# Patient Record
Sex: FEMALE | Race: WHITE | Employment: PART TIME | URBAN - METROPOLITAN AREA
[De-identification: names, ages, dates, MRNs, and addresses within clinical notes are randomized per-mention and may not be internally consistent; named-entity substitution may affect disease eponyms.]

---

## 2017-04-07 ENCOUNTER — OFFICE VISIT (OUTPATIENT)
Dept: URGENT CARE | Age: 42
End: 2017-04-07

## 2017-04-07 VITALS
BODY MASS INDEX: 50.02 KG/M2 | TEMPERATURE: 98.5 F | WEIGHT: 293 LBS | HEIGHT: 64 IN | HEART RATE: 97 BPM | OXYGEN SATURATION: 95 % | RESPIRATION RATE: 20 BRPM

## 2017-04-07 DIAGNOSIS — J01.91 ACUTE RECURRENT SINUSITIS, UNSPECIFIED LOCATION: Primary | ICD-10-CM

## 2017-04-07 PROCEDURE — 99212 OFFICE O/P EST SF 10 MIN: CPT | Performed by: PHYSICIAN ASSISTANT

## 2017-04-07 RX ORDER — METHYLPREDNISOLONE 4 MG/1
TABLET ORAL
Qty: 1 KIT | Refills: 0 | Status: ON HOLD | OUTPATIENT
Start: 2017-04-07 | End: 2020-12-29 | Stop reason: HOSPADM

## 2017-04-07 ASSESSMENT — ENCOUNTER SYMPTOMS
WHEEZING: 1
NAUSEA: 0
SORE THROAT: 1
VOMITING: 0
ABDOMINAL PAIN: 0
SHORTNESS OF BREATH: 0
STRIDOR: 0
SPUTUM PRODUCTION: 1
COUGH: 1
DIARRHEA: 0

## 2020-12-26 ENCOUNTER — HOSPITAL ENCOUNTER (INPATIENT)
Age: 45
LOS: 3 days | Discharge: HOME | DRG: 177 | End: 2020-12-29
Attending: INTERNAL MEDICINE | Admitting: INTERNAL MEDICINE
Payer: COMMERCIAL

## 2020-12-26 ENCOUNTER — APPOINTMENT (OUTPATIENT)
Dept: GENERAL RADIOLOGY | Age: 45
DRG: 177 | End: 2020-12-26
Payer: COMMERCIAL

## 2020-12-26 PROBLEM — U07.1 ACUTE RESPIRATORY FAILURE DUE TO COVID-19 (HCC): Status: ACTIVE | Noted: 2020-12-26

## 2020-12-26 PROBLEM — E66.01 MORBID OBESITY WITH BMI OF 70 AND OVER, ADULT (HCC): Chronic | Status: ACTIVE | Noted: 2020-12-26

## 2020-12-26 PROBLEM — I10 ESSENTIAL HYPERTENSION: Chronic | Status: ACTIVE | Noted: 2020-12-26

## 2020-12-26 PROBLEM — J96.00 ACUTE RESPIRATORY FAILURE DUE TO COVID-19 (HCC): Status: ACTIVE | Noted: 2020-12-26

## 2020-12-26 LAB
A/G RATIO: 1.2 (ref 1.1–2.2)
ABO/RH: NORMAL
ALBUMIN SERPL-MCNC: 4.2 G/DL (ref 3.4–5)
ALP BLD-CCNC: 59 U/L (ref 40–129)
ALT SERPL-CCNC: 50 U/L (ref 10–40)
ANION GAP SERPL CALCULATED.3IONS-SCNC: 9 MMOL/L (ref 3–16)
ANTIBODY SCREEN: NORMAL
AST SERPL-CCNC: 56 U/L (ref 15–37)
BACTERIA: ABNORMAL /HPF
BASOPHILS ABSOLUTE: 0 K/UL (ref 0–0.2)
BASOPHILS RELATIVE PERCENT: 0.6 %
BILIRUB SERPL-MCNC: 0.3 MG/DL (ref 0–1)
BILIRUBIN URINE: NEGATIVE
BLOOD, URINE: ABNORMAL
BUN BLDV-MCNC: 13 MG/DL (ref 7–20)
CALCIUM SERPL-MCNC: 9.3 MG/DL (ref 8.3–10.6)
CHLORIDE BLD-SCNC: 100 MMOL/L (ref 99–110)
CLARITY: CLEAR
CO2: 27 MMOL/L (ref 21–32)
COLOR: YELLOW
CREAT SERPL-MCNC: 0.9 MG/DL (ref 0.6–1.1)
D DIMER: 316 NG/ML DDU (ref 0–229)
EOSINOPHILS ABSOLUTE: 0.1 K/UL (ref 0–0.6)
EOSINOPHILS RELATIVE PERCENT: 2.7 %
EPITHELIAL CELLS, UA: ABNORMAL /HPF (ref 0–5)
FIBRINOGEN: 471 MG/DL (ref 200–397)
GFR AFRICAN AMERICAN: >60
GFR NON-AFRICAN AMERICAN: >60
GLOBULIN: 3.6 G/DL
GLUCOSE BLD-MCNC: 90 MG/DL (ref 70–99)
GLUCOSE URINE: NEGATIVE MG/DL
HCG QUALITATIVE: NEGATIVE
HCT VFR BLD CALC: 40.3 % (ref 36–48)
HEMOGLOBIN: 12.8 G/DL (ref 12–16)
INR BLD: 1.01 (ref 0.86–1.14)
KETONES, URINE: NEGATIVE MG/DL
LACTATE DEHYDROGENASE: 564 U/L (ref 100–190)
LACTIC ACID: 0.6 MMOL/L (ref 0.4–2)
LEUKOCYTE ESTERASE, URINE: NEGATIVE
LIPASE: 22 U/L (ref 13–60)
LYMPHOCYTES ABSOLUTE: 0.6 K/UL (ref 1–5.1)
LYMPHOCYTES RELATIVE PERCENT: 20 %
MCH RBC QN AUTO: 25 PG (ref 26–34)
MCHC RBC AUTO-ENTMCNC: 31.6 G/DL (ref 31–36)
MCV RBC AUTO: 78.9 FL (ref 80–100)
MICROSCOPIC EXAMINATION: YES
MONOCYTES ABSOLUTE: 0.2 K/UL (ref 0–1.3)
MONOCYTES RELATIVE PERCENT: 6.4 %
NEUTROPHILS ABSOLUTE: 2 K/UL (ref 1.7–7.7)
NEUTROPHILS RELATIVE PERCENT: 70.3 %
NITRITE, URINE: NEGATIVE
PDW BLD-RTO: 17.8 % (ref 12.4–15.4)
PH UA: 6 (ref 5–8)
PLATELET # BLD: 169 K/UL (ref 135–450)
PMV BLD AUTO: 6.8 FL (ref 5–10.5)
POTASSIUM REFLEX MAGNESIUM: 5.6 MMOL/L (ref 3.5–5.1)
PRO-BNP: 30 PG/ML (ref 0–124)
PROCALCITONIN: 0.07 NG/ML (ref 0–0.15)
PROTEIN UA: NEGATIVE MG/DL
PROTHROMBIN TIME: 11.7 SEC (ref 10–13.2)
RBC # BLD: 5.11 M/UL (ref 4–5.2)
RBC UA: ABNORMAL /HPF (ref 0–4)
REASON FOR REJECTION: NORMAL
REJECTED TEST: NORMAL
SARS-COV-2, NAAT: DETECTED
SODIUM BLD-SCNC: 136 MMOL/L (ref 136–145)
SPECIFIC GRAVITY UA: 1.02 (ref 1–1.03)
TOTAL PROTEIN: 7.8 G/DL (ref 6.4–8.2)
TROPONIN: <0.01 NG/ML
URINE REFLEX TO CULTURE: ABNORMAL
URINE TYPE: ABNORMAL
UROBILINOGEN, URINE: 0.2 E.U./DL
WBC # BLD: 2.8 K/UL (ref 4–11)
WBC UA: ABNORMAL /HPF (ref 0–5)

## 2020-12-26 PROCEDURE — 83605 ASSAY OF LACTIC ACID: CPT

## 2020-12-26 PROCEDURE — XW033E5 INTRODUCTION OF REMDESIVIR ANTI-INFECTIVE INTO PERIPHERAL VEIN, PERCUTANEOUS APPROACH, NEW TECHNOLOGY GROUP 5: ICD-10-PCS | Performed by: INTERNAL MEDICINE

## 2020-12-26 PROCEDURE — 84703 CHORIONIC GONADOTROPIN ASSAY: CPT

## 2020-12-26 PROCEDURE — 82728 ASSAY OF FERRITIN: CPT

## 2020-12-26 PROCEDURE — 86140 C-REACTIVE PROTEIN: CPT

## 2020-12-26 PROCEDURE — 85610 PROTHROMBIN TIME: CPT

## 2020-12-26 PROCEDURE — 80053 COMPREHEN METABOLIC PANEL: CPT

## 2020-12-26 PROCEDURE — 83690 ASSAY OF LIPASE: CPT

## 2020-12-26 PROCEDURE — 81001 URINALYSIS AUTO W/SCOPE: CPT

## 2020-12-26 PROCEDURE — 86901 BLOOD TYPING SEROLOGIC RH(D): CPT

## 2020-12-26 PROCEDURE — 85025 COMPLETE CBC W/AUTO DIFF WBC: CPT

## 2020-12-26 PROCEDURE — 99285 EMERGENCY DEPT VISIT HI MDM: CPT

## 2020-12-26 PROCEDURE — 71045 X-RAY EXAM CHEST 1 VIEW: CPT

## 2020-12-26 PROCEDURE — 6360000002 HC RX W HCPCS: Performed by: PHYSICIAN ASSISTANT

## 2020-12-26 PROCEDURE — 96374 THER/PROPH/DIAG INJ IV PUSH: CPT

## 2020-12-26 PROCEDURE — 83615 LACTATE (LD) (LDH) ENZYME: CPT

## 2020-12-26 PROCEDURE — 86850 RBC ANTIBODY SCREEN: CPT

## 2020-12-26 PROCEDURE — 85379 FIBRIN DEGRADATION QUANT: CPT

## 2020-12-26 PROCEDURE — 84484 ASSAY OF TROPONIN QUANT: CPT

## 2020-12-26 PROCEDURE — 85384 FIBRINOGEN ACTIVITY: CPT

## 2020-12-26 PROCEDURE — XW13325 TRANSFUSION OF CONVALESCENT PLASMA (NONAUTOLOGOUS) INTO PERIPHERAL VEIN, PERCUTANEOUS APPROACH, NEW TECHNOLOGY GROUP 5: ICD-10-PCS | Performed by: INTERNAL MEDICINE

## 2020-12-26 PROCEDURE — 2060000000 HC ICU INTERMEDIATE R&B

## 2020-12-26 PROCEDURE — 86900 BLOOD TYPING SEROLOGIC ABO: CPT

## 2020-12-26 PROCEDURE — U0002 COVID-19 LAB TEST NON-CDC: HCPCS

## 2020-12-26 PROCEDURE — 83880 ASSAY OF NATRIURETIC PEPTIDE: CPT

## 2020-12-26 PROCEDURE — 93005 ELECTROCARDIOGRAM TRACING: CPT | Performed by: PHYSICIAN ASSISTANT

## 2020-12-26 PROCEDURE — 82306 VITAMIN D 25 HYDROXY: CPT

## 2020-12-26 PROCEDURE — 84145 PROCALCITONIN (PCT): CPT

## 2020-12-26 PROCEDURE — 6370000000 HC RX 637 (ALT 250 FOR IP): Performed by: INTERNAL MEDICINE

## 2020-12-26 PROCEDURE — 36415 COLL VENOUS BLD VENIPUNCTURE: CPT

## 2020-12-26 RX ORDER — BENZONATATE 100 MG/1
200 CAPSULE ORAL 3 TIMES DAILY PRN
Status: DISCONTINUED | OUTPATIENT
Start: 2020-12-26 | End: 2020-12-29 | Stop reason: HOSPADM

## 2020-12-26 RX ORDER — ACETAMINOPHEN 325 MG/1
650 TABLET ORAL EVERY 6 HOURS PRN
Status: DISCONTINUED | OUTPATIENT
Start: 2020-12-26 | End: 2020-12-29 | Stop reason: HOSPADM

## 2020-12-26 RX ORDER — SODIUM CHLORIDE 0.9 % (FLUSH) 0.9 %
10 SYRINGE (ML) INJECTION PRN
Status: DISCONTINUED | OUTPATIENT
Start: 2020-12-26 | End: 2020-12-29 | Stop reason: HOSPADM

## 2020-12-26 RX ORDER — SODIUM CHLORIDE 9 MG/ML
INJECTION, SOLUTION INTRAVENOUS PRN
Status: DISCONTINUED | OUTPATIENT
Start: 2020-12-26 | End: 2020-12-29 | Stop reason: HOSPADM

## 2020-12-26 RX ORDER — FLUTICASONE PROPIONATE 50 MCG
2 SPRAY, SUSPENSION (ML) NASAL DAILY
Status: DISCONTINUED | OUTPATIENT
Start: 2020-12-27 | End: 2020-12-29 | Stop reason: HOSPADM

## 2020-12-26 RX ORDER — ACETAMINOPHEN 650 MG/1
650 SUPPOSITORY RECTAL EVERY 6 HOURS PRN
Status: DISCONTINUED | OUTPATIENT
Start: 2020-12-26 | End: 2020-12-29 | Stop reason: HOSPADM

## 2020-12-26 RX ORDER — DILTIAZEM HYDROCHLORIDE 120 MG/1
120 CAPSULE, COATED, EXTENDED RELEASE ORAL DAILY
Status: DISCONTINUED | OUTPATIENT
Start: 2020-12-27 | End: 2020-12-29 | Stop reason: HOSPADM

## 2020-12-26 RX ORDER — POTASSIUM CHLORIDE 7.45 MG/ML
10 INJECTION INTRAVENOUS PRN
Status: DISCONTINUED | OUTPATIENT
Start: 2020-12-26 | End: 2020-12-28

## 2020-12-26 RX ORDER — GUAIFENESIN/DEXTROMETHORPHAN 100-10MG/5
5 SYRUP ORAL EVERY 4 HOURS PRN
Status: DISCONTINUED | OUTPATIENT
Start: 2020-12-26 | End: 2020-12-29 | Stop reason: HOSPADM

## 2020-12-26 RX ORDER — LEVOTHYROXINE SODIUM 112 UG/1
112 TABLET ORAL DAILY
Status: DISCONTINUED | OUTPATIENT
Start: 2020-12-27 | End: 2020-12-29 | Stop reason: HOSPADM

## 2020-12-26 RX ORDER — VITAMIN B COMPLEX
2000 TABLET ORAL DAILY
Status: DISCONTINUED | OUTPATIENT
Start: 2020-12-27 | End: 2020-12-29 | Stop reason: HOSPADM

## 2020-12-26 RX ORDER — DEXAMETHASONE SODIUM PHOSPHATE 10 MG/ML
10 INJECTION INTRAMUSCULAR; INTRAVENOUS ONCE
Status: COMPLETED | OUTPATIENT
Start: 2020-12-26 | End: 2020-12-26

## 2020-12-26 RX ORDER — DULOXETIN HYDROCHLORIDE 30 MG/1
30 CAPSULE, DELAYED RELEASE ORAL DAILY
Status: DISCONTINUED | OUTPATIENT
Start: 2020-12-27 | End: 2020-12-29 | Stop reason: HOSPADM

## 2020-12-26 RX ORDER — ALBUTEROL SULFATE 90 UG/1
2 AEROSOL, METERED RESPIRATORY (INHALATION) EVERY 4 HOURS PRN
Status: DISCONTINUED | OUTPATIENT
Start: 2020-12-26 | End: 2020-12-27

## 2020-12-26 RX ORDER — 0.9 % SODIUM CHLORIDE 0.9 %
30 INTRAVENOUS SOLUTION INTRAVENOUS PRN
Status: DISCONTINUED | OUTPATIENT
Start: 2020-12-26 | End: 2020-12-29 | Stop reason: HOSPADM

## 2020-12-26 RX ORDER — ONDANSETRON 2 MG/ML
4 INJECTION INTRAMUSCULAR; INTRAVENOUS EVERY 6 HOURS PRN
Status: DISCONTINUED | OUTPATIENT
Start: 2020-12-26 | End: 2020-12-29 | Stop reason: HOSPADM

## 2020-12-26 RX ORDER — LAMOTRIGINE 25 MG/1
50 TABLET ORAL DAILY
Status: DISCONTINUED | OUTPATIENT
Start: 2020-12-27 | End: 2020-12-29 | Stop reason: HOSPADM

## 2020-12-26 RX ORDER — METHYLPREDNISOLONE SODIUM SUCCINATE 40 MG/ML
40 INJECTION, POWDER, LYOPHILIZED, FOR SOLUTION INTRAMUSCULAR; INTRAVENOUS EVERY 12 HOURS
Status: DISCONTINUED | OUTPATIENT
Start: 2020-12-27 | End: 2020-12-29 | Stop reason: HOSPADM

## 2020-12-26 RX ORDER — POTASSIUM CHLORIDE 20 MEQ/1
40 TABLET, EXTENDED RELEASE ORAL PRN
Status: DISCONTINUED | OUTPATIENT
Start: 2020-12-26 | End: 2020-12-28

## 2020-12-26 RX ORDER — MAGNESIUM SULFATE 1 G/100ML
1 INJECTION INTRAVENOUS PRN
Status: DISCONTINUED | OUTPATIENT
Start: 2020-12-26 | End: 2020-12-28

## 2020-12-26 RX ORDER — LEVOTHYROXINE SODIUM 0.15 MG/1
150 TABLET ORAL DAILY
Status: DISCONTINUED | OUTPATIENT
Start: 2020-12-27 | End: 2020-12-26

## 2020-12-26 RX ORDER — PROMETHAZINE HYDROCHLORIDE 25 MG/1
12.5 TABLET ORAL EVERY 6 HOURS PRN
Status: DISCONTINUED | OUTPATIENT
Start: 2020-12-26 | End: 2020-12-29 | Stop reason: HOSPADM

## 2020-12-26 RX ADMIN — DEXAMETHASONE SODIUM PHOSPHATE 10 MG: 10 INJECTION INTRAMUSCULAR; INTRAVENOUS at 20:51

## 2020-12-26 NOTE — ED NOTES
Pt 85% on RA on arrival to ED. Pt placed on 4 L O2 via nc and SPO2 estefany to 96%.  Pt weaned down to 1 L O2 via nc and is maintaining 91%     Selena Elizabeth RN  12/26/20 0844

## 2020-12-26 NOTE — ED NOTES
Bed: 24  Expected date:   Expected time:   Means of arrival:   Comments:  [id number], RN  12/26/20 8989

## 2020-12-27 PROBLEM — J12.82 PNEUMONIA DUE TO COVID-19 VIRUS: Status: ACTIVE | Noted: 2020-12-27

## 2020-12-27 PROBLEM — E66.01 SUPER-SUPER OBESE (HCC): Status: ACTIVE | Noted: 2020-12-26

## 2020-12-27 PROBLEM — U07.1 PNEUMONIA DUE TO COVID-19 VIRUS: Status: ACTIVE | Noted: 2020-12-27

## 2020-12-27 PROBLEM — A41.9 SEPSIS (HCC): Status: ACTIVE | Noted: 2020-12-27

## 2020-12-27 LAB
A/G RATIO: 1.2 (ref 1.1–2.2)
ALBUMIN SERPL-MCNC: 4.2 G/DL (ref 3.4–5)
ALP BLD-CCNC: 67 U/L (ref 40–129)
ALT SERPL-CCNC: 54 U/L (ref 10–40)
ANION GAP SERPL CALCULATED.3IONS-SCNC: 7 MMOL/L (ref 3–16)
APTT: 34.9 SEC (ref 24.2–36.2)
AST SERPL-CCNC: 42 U/L (ref 15–37)
BASOPHILS ABSOLUTE: 0 K/UL (ref 0–0.2)
BASOPHILS RELATIVE PERCENT: 0.7 %
BILIRUB SERPL-MCNC: 0.3 MG/DL (ref 0–1)
BLOOD BANK DISPENSE STATUS: NORMAL
BLOOD BANK PRODUCT CODE: NORMAL
BPU ID: NORMAL
BUN BLDV-MCNC: 12 MG/DL (ref 7–20)
C-REACTIVE PROTEIN: 24.6 MG/L (ref 0–5.1)
CALCIUM SERPL-MCNC: 9.3 MG/DL (ref 8.3–10.6)
CHLORIDE BLD-SCNC: 101 MMOL/L (ref 99–110)
CO2: 27 MMOL/L (ref 21–32)
CREAT SERPL-MCNC: 0.9 MG/DL (ref 0.6–1.1)
D DIMER: 289 NG/ML DDU (ref 0–229)
DESCRIPTION BLOOD BANK: NORMAL
EKG ATRIAL RATE: 78 BPM
EKG DIAGNOSIS: NORMAL
EKG P AXIS: -5 DEGREES
EKG P-R INTERVAL: 168 MS
EKG Q-T INTERVAL: 396 MS
EKG QRS DURATION: 82 MS
EKG QTC CALCULATION (BAZETT): 451 MS
EKG R AXIS: -32 DEGREES
EKG T AXIS: 23 DEGREES
EKG VENTRICULAR RATE: 78 BPM
EOSINOPHILS ABSOLUTE: 0 K/UL (ref 0–0.6)
EOSINOPHILS RELATIVE PERCENT: 0.2 %
FERRITIN: 44.1 NG/ML (ref 15–150)
FIBRINOGEN: 442 MG/DL (ref 200–397)
GFR AFRICAN AMERICAN: >60
GFR NON-AFRICAN AMERICAN: >60
GLOBULIN: 3.6 G/DL
GLUCOSE BLD-MCNC: 128 MG/DL (ref 70–99)
GLUCOSE BLD-MCNC: 150 MG/DL (ref 70–99)
GLUCOSE BLD-MCNC: 162 MG/DL (ref 70–99)
GLUCOSE BLD-MCNC: 179 MG/DL (ref 70–99)
HCT VFR BLD CALC: 40.7 % (ref 36–48)
HEMOGLOBIN: 12.9 G/DL (ref 12–16)
INR BLD: 0.97 (ref 0.86–1.14)
LYMPHOCYTES ABSOLUTE: 0.3 K/UL (ref 1–5.1)
LYMPHOCYTES RELATIVE PERCENT: 15.1 %
MCH RBC QN AUTO: 24.5 PG (ref 26–34)
MCHC RBC AUTO-ENTMCNC: 31.6 G/DL (ref 31–36)
MCV RBC AUTO: 77.4 FL (ref 80–100)
MONOCYTES ABSOLUTE: 0 K/UL (ref 0–1.3)
MONOCYTES RELATIVE PERCENT: 1.5 %
NEUTROPHILS ABSOLUTE: 1.9 K/UL (ref 1.7–7.7)
NEUTROPHILS RELATIVE PERCENT: 82.5 %
PDW BLD-RTO: 18.1 % (ref 12.4–15.4)
PERFORMED ON: ABNORMAL
PLATELET # BLD: 176 K/UL (ref 135–450)
PMV BLD AUTO: 6.4 FL (ref 5–10.5)
POTASSIUM REFLEX MAGNESIUM: 4.5 MMOL/L (ref 3.5–5.1)
PROTHROMBIN TIME: 11.3 SEC (ref 10–13.2)
RBC # BLD: 5.26 M/UL (ref 4–5.2)
SODIUM BLD-SCNC: 135 MMOL/L (ref 136–145)
TOTAL PROTEIN: 7.8 G/DL (ref 6.4–8.2)
TSH SERPL DL<=0.05 MIU/L-ACNC: 1.62 UIU/ML (ref 0.27–4.2)
VITAMIN D 25-HYDROXY: 23.6 NG/ML
WBC # BLD: 2.3 K/UL (ref 4–11)

## 2020-12-27 PROCEDURE — 6360000002 HC RX W HCPCS: Performed by: INTERNAL MEDICINE

## 2020-12-27 PROCEDURE — 85610 PROTHROMBIN TIME: CPT

## 2020-12-27 PROCEDURE — 85730 THROMBOPLASTIN TIME PARTIAL: CPT

## 2020-12-27 PROCEDURE — 36415 COLL VENOUS BLD VENIPUNCTURE: CPT

## 2020-12-27 PROCEDURE — 2500000003 HC RX 250 WO HCPCS: Performed by: INTERNAL MEDICINE

## 2020-12-27 PROCEDURE — 84443 ASSAY THYROID STIM HORMONE: CPT

## 2020-12-27 PROCEDURE — 93010 ELECTROCARDIOGRAM REPORT: CPT | Performed by: INTERNAL MEDICINE

## 2020-12-27 PROCEDURE — 85384 FIBRINOGEN ACTIVITY: CPT

## 2020-12-27 PROCEDURE — 85025 COMPLETE CBC W/AUTO DIFF WBC: CPT

## 2020-12-27 PROCEDURE — 85379 FIBRIN DEGRADATION QUANT: CPT

## 2020-12-27 PROCEDURE — 94761 N-INVAS EAR/PLS OXIMETRY MLT: CPT

## 2020-12-27 PROCEDURE — 6370000000 HC RX 637 (ALT 250 FOR IP): Performed by: INTERNAL MEDICINE

## 2020-12-27 PROCEDURE — 1200000000 HC SEMI PRIVATE

## 2020-12-27 PROCEDURE — 2580000003 HC RX 258: Performed by: INTERNAL MEDICINE

## 2020-12-27 PROCEDURE — 80053 COMPREHEN METABOLIC PANEL: CPT

## 2020-12-27 PROCEDURE — 94150 VITAL CAPACITY TEST: CPT

## 2020-12-27 PROCEDURE — 2700000000 HC OXYGEN THERAPY PER DAY

## 2020-12-27 RX ORDER — ZINC SULFATE 50(220)MG
50 CAPSULE ORAL 2 TIMES DAILY
Status: DISCONTINUED | OUTPATIENT
Start: 2020-12-27 | End: 2020-12-29 | Stop reason: HOSPADM

## 2020-12-27 RX ORDER — FAMOTIDINE 20 MG/1
20 TABLET, FILM COATED ORAL 2 TIMES DAILY
Status: DISCONTINUED | OUTPATIENT
Start: 2020-12-27 | End: 2020-12-29 | Stop reason: HOSPADM

## 2020-12-27 RX ORDER — DEXTROSE MONOHYDRATE 25 G/50ML
12.5 INJECTION, SOLUTION INTRAVENOUS PRN
Status: DISCONTINUED | OUTPATIENT
Start: 2020-12-27 | End: 2020-12-29 | Stop reason: HOSPADM

## 2020-12-27 RX ORDER — ASCORBIC ACID 500 MG
500 TABLET ORAL 3 TIMES DAILY
Status: DISCONTINUED | OUTPATIENT
Start: 2020-12-27 | End: 2020-12-29 | Stop reason: HOSPADM

## 2020-12-27 RX ORDER — SPIRONOLACTONE 25 MG/1
25 TABLET ORAL DAILY
Status: DISCONTINUED | OUTPATIENT
Start: 2020-12-27 | End: 2020-12-29 | Stop reason: HOSPADM

## 2020-12-27 RX ORDER — ALBUTEROL SULFATE 90 UG/1
2 AEROSOL, METERED RESPIRATORY (INHALATION) EVERY 4 HOURS PRN
Status: DISCONTINUED | OUTPATIENT
Start: 2020-12-27 | End: 2020-12-29 | Stop reason: HOSPADM

## 2020-12-27 RX ORDER — DEXTROSE MONOHYDRATE 50 MG/ML
100 INJECTION, SOLUTION INTRAVENOUS PRN
Status: DISCONTINUED | OUTPATIENT
Start: 2020-12-27 | End: 2020-12-29 | Stop reason: HOSPADM

## 2020-12-27 RX ORDER — NICOTINE POLACRILEX 4 MG
15 LOZENGE BUCCAL PRN
Status: DISCONTINUED | OUTPATIENT
Start: 2020-12-27 | End: 2020-12-29 | Stop reason: HOSPADM

## 2020-12-27 RX ORDER — GAUZE BANDAGE 2" X 2"
100 BANDAGE TOPICAL DAILY
Status: DISCONTINUED | OUTPATIENT
Start: 2020-12-28 | End: 2020-12-29 | Stop reason: HOSPADM

## 2020-12-27 RX ORDER — LANOLIN ALCOHOL/MO/W.PET/CERES
100 CREAM (GRAM) TOPICAL DAILY
Status: DISCONTINUED | OUTPATIENT
Start: 2020-12-27 | End: 2020-12-27 | Stop reason: SDUPTHER

## 2020-12-27 RX ADMIN — ZINC SULFATE 220 MG (50 MG) CAPSULE 50 MG: CAPSULE at 20:53

## 2020-12-27 RX ADMIN — SODIUM CHLORIDE, PRESERVATIVE FREE 10 ML: 5 INJECTION INTRAVENOUS at 23:34

## 2020-12-27 RX ADMIN — REMDESIVIR 100 MG: 100 INJECTION, POWDER, LYOPHILIZED, FOR SOLUTION INTRAVENOUS at 23:36

## 2020-12-27 RX ADMIN — BENZONATATE 200 MG: 100 CAPSULE ORAL at 10:11

## 2020-12-27 RX ADMIN — SODIUM CHLORIDE 30 ML: 9 INJECTION, SOLUTION INTRAVENOUS at 02:13

## 2020-12-27 RX ADMIN — OXYCODONE HYDROCHLORIDE AND ACETAMINOPHEN 500 MG: 500 TABLET ORAL at 23:36

## 2020-12-27 RX ADMIN — ENOXAPARIN SODIUM 40 MG: 40 INJECTION SUBCUTANEOUS at 00:59

## 2020-12-27 RX ADMIN — SODIUM CHLORIDE, PRESERVATIVE FREE 10 ML: 5 INJECTION INTRAVENOUS at 09:43

## 2020-12-27 RX ADMIN — OXYCODONE HYDROCHLORIDE AND ACETAMINOPHEN 500 MG: 500 TABLET ORAL at 09:45

## 2020-12-27 RX ADMIN — LEVOTHYROXINE SODIUM 112 MCG: 0.11 TABLET ORAL at 06:33

## 2020-12-27 RX ADMIN — METHYLPREDNISOLONE SODIUM SUCCINATE 40 MG: 40 INJECTION, POWDER, FOR SOLUTION INTRAMUSCULAR; INTRAVENOUS at 09:43

## 2020-12-27 RX ADMIN — ACETAMINOPHEN 650 MG: 325 TABLET ORAL at 14:55

## 2020-12-27 RX ADMIN — ACETAMINOPHEN 650 MG: 325 TABLET ORAL at 20:55

## 2020-12-27 RX ADMIN — METHYLPREDNISOLONE SODIUM SUCCINATE 40 MG: 40 INJECTION, POWDER, FOR SOLUTION INTRAMUSCULAR; INTRAVENOUS at 20:53

## 2020-12-27 RX ADMIN — BENZONATATE 200 MG: 100 CAPSULE ORAL at 01:00

## 2020-12-27 RX ADMIN — DILTIAZEM HYDROCHLORIDE 120 MG: 120 CAPSULE, COATED, EXTENDED RELEASE ORAL at 06:56

## 2020-12-27 RX ADMIN — ENOXAPARIN SODIUM 40 MG: 40 INJECTION SUBCUTANEOUS at 20:53

## 2020-12-27 RX ADMIN — SPIRONOLACTONE 25 MG: 25 TABLET ORAL at 09:50

## 2020-12-27 RX ADMIN — REMDESIVIR 200 MG: 100 INJECTION, POWDER, LYOPHILIZED, FOR SOLUTION INTRAVENOUS at 01:03

## 2020-12-27 RX ADMIN — DULOXETINE HYDROCHLORIDE 30 MG: 30 CAPSULE, DELAYED RELEASE ORAL at 09:42

## 2020-12-27 RX ADMIN — FAMOTIDINE 20 MG: 20 TABLET ORAL at 20:53

## 2020-12-27 RX ADMIN — FAMOTIDINE 20 MG: 20 TABLET ORAL at 09:42

## 2020-12-27 RX ADMIN — LAMOTRIGINE 50 MG: 25 TABLET ORAL at 09:42

## 2020-12-27 RX ADMIN — ENOXAPARIN SODIUM 40 MG: 40 INJECTION SUBCUTANEOUS at 09:42

## 2020-12-27 RX ADMIN — SODIUM CHLORIDE, PRESERVATIVE FREE 10 ML: 5 INJECTION INTRAVENOUS at 01:00

## 2020-12-27 RX ADMIN — Medication 2000 UNITS: at 09:41

## 2020-12-27 RX ADMIN — THIAMINE HCL TAB 100 MG 100 MG: 100 TAB at 09:46

## 2020-12-27 RX ADMIN — ZINC SULFATE 220 MG (50 MG) CAPSULE 50 MG: CAPSULE at 09:42

## 2020-12-27 RX ADMIN — FLUTICASONE PROPIONATE 2 SPRAY: 50 SPRAY, METERED NASAL at 09:41

## 2020-12-27 RX ADMIN — ACETAMINOPHEN 650 MG: 325 TABLET ORAL at 06:56

## 2020-12-27 ASSESSMENT — PAIN DESCRIPTION - LOCATION
LOCATION: BACK
LOCATION: HEAD

## 2020-12-27 ASSESSMENT — PAIN DESCRIPTION - PROGRESSION
CLINICAL_PROGRESSION: NOT CHANGED
CLINICAL_PROGRESSION: NOT CHANGED

## 2020-12-27 ASSESSMENT — PAIN DESCRIPTION - FREQUENCY
FREQUENCY: CONTINUOUS

## 2020-12-27 ASSESSMENT — PAIN DESCRIPTION - DESCRIPTORS
DESCRIPTORS: HEADACHE
DESCRIPTORS: THROBBING;HEADACHE
DESCRIPTORS: DULL;ACHING;SHARP

## 2020-12-27 ASSESSMENT — PAIN DESCRIPTION - ORIENTATION
ORIENTATION: LOWER
ORIENTATION: LEFT
ORIENTATION: LEFT

## 2020-12-27 ASSESSMENT — PAIN DESCRIPTION - ONSET
ONSET: ON-GOING
ONSET: ON-GOING
ONSET: GRADUAL

## 2020-12-27 ASSESSMENT — PAIN SCALES - GENERAL
PAINLEVEL_OUTOF10: 6
PAINLEVEL_OUTOF10: 8
PAINLEVEL_OUTOF10: 3
PAINLEVEL_OUTOF10: 7
PAINLEVEL_OUTOF10: 6

## 2020-12-27 ASSESSMENT — PAIN DESCRIPTION - PAIN TYPE
TYPE: ACUTE PAIN

## 2020-12-27 ASSESSMENT — PAIN - FUNCTIONAL ASSESSMENT: PAIN_FUNCTIONAL_ASSESSMENT: ACTIVITIES ARE NOT PREVENTED

## 2020-12-27 NOTE — PROGRESS NOTES
Paged Dr. Priyanka Gómez regarding elevated BP. See MAR for details. 12/27/20 6:39 AM   Pt's BP has been consistently elevated overnight. Currently 163/94. Okay to give morning dose of Diltiazem early?   Read 6:43 AM         12/27/20 6:43 AM   Ok

## 2020-12-27 NOTE — H&P
Hospital Medicine History & Physical      Patient:  Mildred Davey  :   1975  MRN:   [id number]  Date of Service: 20    CHIEF COMPLAINT:  Cough, dyspnea    HISTORY OF PRESENT ILLNESS:    Mildred Davey is a 39 y.o. female. She presents from home via ambulance for dyspnea. She has felt ill for roughly the past 8 days. She lives with two family members known to have COVID-23. She usually resides in [place], [place]. She has been visiting her parents for the holidays for the past month. Both her parents are ill with COVID-19. Her father has been hospitalized for about 10 days. Patient's mother got tested 9 days ago and was positive. Patient herself got tested at the same time as her mother and was negative. Her first symptom was 2020 and was primarily nonproductive cough and chest congestion. She also describes constitutional symptoms such as loss of appetite, malaise, fatigue. She then developed watery diarrhea. Respiratory symptoms suddenly accelerated throughout the day today which she describes as exertional dyspnea. She is feeling dyspneic with normal activities such as walking around her parent's home. Patient relates she has been diagnosed with KANDI in the past, but does not use CPAP, BiPAP, or O2 at home. Review of Systems:  All pertinent positives and negatives are as noted in the HPI section. All other systems were reviewed and are negative. Past Medical History:   Diagnosis Date    Allergic rhinitis     Depression     Hypertension     Hypothyroidism        Past Surgical History:   Procedure Laterality Date    CHOLECYSTECTOMY      LAP BAND      LAP BAND  2010    TONSILLECTOMY  11         Prior to Admission medications    Medication Sig Start Date End Date Taking? Authorizing Provider   methylPREDNISolone (MEDROL, FLORES,) 4 MG tablet Take by mouth.  17   Edd Cheung PA-C   Spacer/Aero-Holding Chambers (AEROCHAMBER) MISC Use with inhaler as directed 8/15/16   Alexandre Marino MD   albuterol sulfate HFA (PROVENTIL HFA) 108 (90 BASE) MCG/ACT inhaler Inhale 2 puffs into the lungs every 6 hours as needed for Wheezing or Shortness of Breath 8/15/16   Alexandre Marino MD   metFORMIN (GLUCOPHAGE) 500 MG tablet Take 500 mg by mouth 2 times daily (with meals). Historical Provider, MD   busPIRone (BUSPAR) 15 MG tablet Take 15 mg by mouth 2 times daily as needed. Historical Provider, MD   levothyroxine (SYNTHROID) 150 MCG tablet TAKE ONE TABLET BY MOUTH DAILY 11/19/13   Kristina Gotti MD   atomoxetine (STRATTERA) 80 MG capsule Take 80 mg by mouth daily. Historical Provider, MD   traZODone (DESYREL) 50 MG tablet 1/2 to 1 at [place] [address] 5/24/13   Kristina Gotti MD   fluticasone (FLOVENT HFA) 44 MCG/ACT inhaler Inhale 2 puffs into the lungs 2 times daily. Historical Provider, MD   Multiple Vitamin (MULTI-VITAMIN DAILY) TABS Take  by mouth. Historical Provider, MD   predniSONE (DELTASONE) 10 MG tablet 3 tabs BID x 2 days,3 am and 2 pm x 2 days,2 BID for 2 days,2 am and 1 pm x 2 days,1 BID x 2 days, 1 qd x 2 days   11/27/12   Kristina Gotti MD   FLUoxetine (PROZAC) 40 MG capsule Take 80 mg by mouth daily. Historical Provider, MD   Ferrous Sulfate 50 MG CPCR Take 1 tablet by mouth daily. 9/6/12   Kristina Gotti MD   Cholecalciferol (VITAMIN D3) 5000 UNITS CAPS Take 10,000 Units by mouth daily. Historical Provider, MD   Cyanocobalamin (VITAMIN B-12) 2500 MCG SUBL Place 2,500 mcg under the tongue daily. Historical Provider, MD   cetirizine (ZYRTEC) 10 MG tablet Take 10 mg by mouth daily. Historical Provider, MD       Allergies:   Bactrim, Biaxin [clarithromycin], and Erythromycin    Social:   reports that she has never smoked. She has never used smokeless tobacco.   reports no history of alcohol use.   Social History     Substance and Sexual Activity   Drug Use No       Family History   Problem Relation Age of Onset    High Blood Pressure Mother     Other Mother         mitral valve prolapse    Psoriasis Mother     High Blood Pressure Brother     High Blood Pressure Brother     Rheum Arthritis Maternal Grandfather     Colon Cancer Maternal Grandfather 72    Cancer Maternal Grandfather         melanoma    Colon Cancer Maternal Grandmother 62    Colon Cancer Paternal Grandmother 62       PHYSICAL EXAM:  I performed this physical examination. Vitals:  Patient Vitals for the past 24 hrs:   BP Temp Temp src Pulse Resp SpO2 Height Weight   12/26/20 2044 (!) 149/96 -- -- 83 23 99 % -- --   12/26/20 1945 (!) 142/89 -- -- 81 20 99 % -- --   12/26/20 1856 -- -- -- -- -- 97 % -- --   12/26/20 1844 (!) 126/90 -- -- 82 20 (!) 87 % -- --   12/26/20 1733 (!) 175/92 98.7 °F (37.1 °C) Oral 83 18 95 % 5' 3\" (1.6 m) (!) 440 lb (199.6 kg)     No intake or output data in the 24 hours ending 12/26/20 2104    Vent Settings:  2L/min O2    GEN:  Appearance:  Obese female in NAD . Level of Consciousness:  Alert . Orientation:  Full  No conversational dyspnea    HEENT: Sclera anicteric.  no conjunctival chemosis. moist mucus membranes. no specific or diagnostic oral lesions. NECK:  no signs of meningismus. Jugular veins not discernible. Carotid pulses  2+.  no cervical lymphadenopathy. no thyromegaly. CV:  regular rhythm. normal S1 & S2.    no murmur. no rub.  no gallop. PULM:  Chest excursion is symmetric. Breath sounds are generally vesicular. Adventitious sounds:  Focal inspiratory crackles over right base    AB:  Abdominal shape is obese. Bowel sounds are active. Generally soft to palpation. no tenderness is present. no involuntary guarding. no rebound guarding. EXTR:  Skin is warm. Capillary refill brisk. no specific or pathognomic rash. no clubbing. no pitting edema. no active wound or ulcer.     LABS:  Lab Results   Component Value Date    WBC 2.8 (L) 12/26/2020    HGB 12.8 12/26/2020 HCT 40.3 12/26/2020    MCV 78.9 (L) 12/26/2020     12/26/2020     Lab Results   Component Value Date    CREATININE 0.9 08/06/2012    BUN 12 08/06/2012     08/06/2012    K 4.2 08/06/2012     08/06/2012    CO2 30 08/06/2012     Lab Results   Component Value Date    ALT 32 08/06/2012    AST 23 08/06/2012    ALKPHOS 50 08/06/2012    BILITOT 0.50 08/06/2012     Lab Results   Component Value Date    TROPONINI <0.01 12/26/2020     No results for input(s): PHART, ZRT0NXP, PO2ART in the last 72 hours. IMAGING:  Xr Chest Portable    Result Date: 12/26/2020  EXAMINATION: ONE XRAY VIEW OF THE CHEST 12/26/2020 6:21 pm COMPARISON: 01/14/2014. HISTORY: ORDERING SYSTEM PROVIDED HISTORY: Shortness of breath TECHNOLOGIST PROVIDED HISTORY: Reason for exam:->Shortness of breath Reason for Exam: sob +covid Acuity: Acute Type of Exam: Initial FINDINGS: Study is mildly limited by portable technique and patient's body habitus. Diffuse bilateral pulmonary opacification, likely a multifocal pneumonia. There is no focal consolidation or significant pleural fluid. The cardiac silhouette is mildly prominent, likely accentuated by technique. Bilateral pulmonary opacification most consistent with a multifocal pneumonia including atypical viral pneumonia. I directly reviewed all recent imaging studies as well as pertinent prior studies. EKG:  New and pertinent prior tracings were directly reviewed. My interpretation is as follows:  Normal sinus rhythm with left axis deviation.     Active Hospital Problems    Diagnosis Date Noted    Hypothyroidism [E03.9] 03/26/2012     Priority: High    Morbid obesity with BMI of 70 and over, adult ([facility] [address] [E66.01, Z68.45] 12/26/2020    Acute respiratory failure due to COVID-19 ([facility] [address] [U07.1, J96.00] 12/26/2020    Essential hypertension [I10] 12/26/2020    Asthma, mild persistent [J45.30] 03/14/2013       ASSESSMENT & PLAN  Acute respiratory failure, COVID-19, h/o asthma (untreated)  -  Titrate respiratory support according to patient's needs and advanced care plan. Currently patient is requiring 2L/min O2 support. At baseline does not require O2 supplementation.  -  Risks and benefits of all readily available treatment options were discussed. Based on this discussion will proceed to treat with solumedrol 40mg IV q12h, remdesivir x 5 days, and 1U convalescent plasma. Hypothyroidism  -  Continue home synthroid. Check TSH. HTN  -  Continue home diltiazem. Depression/Anxiety  -  Continue home duloxetine and lamotrigine. DVT prophylaxis: SCDs, lovenox 40 bid (based on D-Dimer and fibrinogen)  Code Status:  Full code  Disposition:  Inpatient. Eventual d/c back to home.     Davide Trujillo MD

## 2020-12-27 NOTE — FLOWSHEET NOTE
12/26/20 2249   Vital Signs   Temp 98.5 °F (36.9 °C)   Temp Source Oral   Pulse 91   Heart Rate Source Monitor   Resp 24   BP (!) 160/96   BP Location Right lower arm   MAP (mmHg) 117   Patient Position Sitting  (side of bed)   Level of Consciousness Alert (0)   MEWS Score 2   Patient Currently in Pain Denies   Height and Weight   Height 5' 3\" (1.6 m)  (5 feet 3 and 1/4 inches)   Weight (!) 439 lb 8 oz (199.4 kg)   Weight Method Standing scale; Actual   BSA (Calculated - sq m) 2.98 sq meters   BMI (Calculated) 78   Oxygen Therapy   SpO2 90 %   Pulse Oximeter Device Mode Continuous   Pulse Oximeter Device Location Finger;Left;Hand   O2 Device Nasal cannula   O2 Flow Rate (L/min) 1 L/min  (increased to 1.5L )

## 2020-12-27 NOTE — ED NOTES
Pts SpO2 86% on room air at rest. Placed pt back on 2LO2 NC and SpO2 now 96%.  Notified Amor Goode, [address] [place] [address] YAO Noriega  12/26/20 1902

## 2020-12-27 NOTE — PROGRESS NOTES
RESPIRATORY THERAPY ASSESSMENT    Name:  Jasmyn Vera Record Number:  [id number]  Age: 39 y.o. Gender: female  : 1975  Today's Date:  2020  Room:  [facility]0426-01    Assessment     Is the patient being admitted for a COPD or Asthma exacerbation? No   (If yes the patient will be seen every 4 hours for the first 24 hours and then reassessed)    Patient Admission Diagnosis      Allergies  Allergies   Allergen Reactions    Bactrim Hives    Biaxin [Clarithromycin] Nausea Only    Erythromycin      Stomach upset       Minimum Predicted Vital Capacity:     780          Actual Vital Capacity:      1200              Pulmonary History:No history  Home Oxygen Therapy:  room air  Home Respiratory Therapy:Albuterol PRN  Current Respiratory Therapy:  MDI Albuterol PRN  Treatment Type: IS       Respiratory Severity Index(RSI)   Patients with orders for inhalation medications, oxygen, or any therapeutic treatment modality will be placed on Respiratory Protocol. They will be assessed with the first treatment and at least every 72 hours thereafter. The following severity scale will be used to determine frequency of treatment intervention.     Smoking History: No Smoking History = 0    Social History  Social History     Tobacco Use    Smoking status: Never Smoker    Smokeless tobacco: Never Used   Substance Use Topics    Alcohol use: No    Drug use: No       Recent Surgical History: None = 0  Past Surgical History  Past Surgical History:   Procedure Laterality Date    CHOLECYSTECTOMY      LAP BAND      LAP BAND  2010    TONSILLECTOMY  11       Level of Consciousness: Alert, Oriented, and Cooperative = 0    Level of Activity: Walking unassisted = 0    Respiratory Pattern: Regular Pattern; RR 8-20 = 0    Breath Sounds: Diminshed bilaterally and/or crackles = 2    Sputum   ,  ,    Cough: Strong, spontaneous, non-productive = 0    Vital Signs   BP (!) 163/94   Pulse 83   Temp 98 °F (36.7 °C) (Oral)   Resp 20   Ht 5' 3\" (1.6 m) Comment: 5 feet 3 and 1/4 inches  Wt (!) 439 lb 8 oz (199.4 kg)   LMP 12/23/2020   SpO2 90%   BMI 77.85 kg/m²   SPO2 (COPD values may differ): 90-91% on room air or greater than 92% on FiO2 24- 28% = 1    Peak Flow (asthma only): not applicable = 0    RSI: 0-4 = See once and convert to home regimen or discontinue        Plan       Goals: medication delivery, mobilize retained secretions, volume expansion and improve oxygenation    Patient/caregiver was educated on the proper method of use for Respiratory Care Devices:  Yes      Level of patient/caregiver understanding able to:   ? Verbalize understanding   ? Demonstrate understanding       ? Teach back        ? Needs reinforcement       ? No available caregiver               ? Other:     Response to education:  Good     Is patient being placed on Home Treatment Regimen? Yes     Does the patient have everything they need prior to discharge? NA     Comments: Patient admits with shortness of breath. Plan of Care: MDI Albuterol PRN    Electronically signed by Matt Delaney RCP on 12/27/2020 at 6:33 AM    Respiratory Protocol Guidelines     1. Assessment and treatment by Respiratory Therapy will be initiated for medication and therapeutic interventions upon initiation of aerosolized medication. 2. Physician will be contacted for respiratory rate (RR) greater than 35 breaths per minute. Therapy will be held for heart rate (HR) greater than 140 beats per minute, pending direction from physician. 3. Bronchodilators will be administered via Metered Dose Inhaler (MDI) with spacer when the following criteria are met:  a. Alert and cooperative     b. HR < 140 bpm  c. RR < 30 bpm                d. Can demonstrate a 2-3 second inspiratory hold  4. Bronchodilators will be administered via Hand Held Nebulizer ELIOT [facility]) to patients when ANY of the following criteria are met  a. Incognizant or uncooperative          b.  Patients treated with HHN at Home        c. Unable to demonstrate proper use of MDI with spacer     d. RR > 30 bpm   5. Bronchodilators will be delivered via Metered Dose Inhaler (MDI), HHN, Aerogen to intubated patients on mechanical ventilation. 6. Inhalation medication orders will be delivered and/or substituted as outlined below. Aerosolized Medications Ordering and Administration Guidelines:    1. All Medications will be ordered by a physician, and their frequency and/or modality will be adjusted as defined by the patients Respiratory Severity Index (RSI) score. 2. If the patient does not have documented COPD, consider discontinuing anticholinergics when RSI is less than 9.  3. If the bronchospasm worsens (increased RSI), then the bronchodilator frequency can be increased to a maximum of every 4 hours. If greater than every 4 hours is required, the physician will be contacted. 4. If the bronchospasm improves, the frequency of the bronchodilator can be decreased, based on the patient's RSI, but not less than home treatment regimen frequency. 5. Bronchodilator(s) will be discontinued if patient has a RSI less than 9 and has received no scheduled or as needed treatment for 72  Hrs. Patients Ordered on a Mucolytic Agent:    1. Must always be administered with a bronchodilator. 2. Discontinue if patient experiences worsened bronchospasm, or secretions have lessened to the point that the patient is able to clear them with a cough. Anti-inflammatory and Combination Medications:    1. If the patient lacks prior history of lung disease, is not using inhaled anti-inflammatory medication at home, and lacks wheezing by examination or by history for at least 24 hours, contact physician for possible discontinuation.

## 2020-12-27 NOTE — PLAN OF CARE
Problem: Falls - Risk of:  Goal: Will remain free from falls  Description: Will remain free from falls  Outcome: Ongoing     Problem: Gas Exchange - Impaired  Goal: Absence of hypoxia  Outcome: Ongoing     Problem:  Body Temperature -  Risk of, Imbalanced  Goal: Complications related to the disease process, condition or treatment will be avoided or minimized  Outcome: Ongoing     Problem: Risk for Fluid Volume Deficit  Goal: Maintain normal serum potassium, sodium, calcium, phosphorus, and pH  Outcome: Ongoing

## 2020-12-27 NOTE — ED NOTES
Bedside report given to [facility], RN. Pt in route to C4 via wheelchair at this time in stable condition on 1LO2 NC. All belongings sent with pt. Care transferred.      Kate Brar RN  12/26/20 8690

## 2020-12-27 NOTE — PROGRESS NOTES
Hospitalist Progress Note    CC: Pneumonia due to COVID-19 virus    Hospital course:  37yo F with PMHx sig for hypothyroidism and super super obesity with BMI of 77.8 who presents with shortness of breath, fever. Both parents are positive for [address]. She currently is hypoxic requiring 1-2L oxygen, lymphopenia, transaminitis, elevated D dimer and COVID19 positive. Meets criteria for sepsis based on above criteria plus tachypnea. Pt states she does not have asthma. Admit date: 12/26/2020  Days in hospital:  1    24 Hour Events: feels better with oxygen    Subjective: pt verbalized understanding about when able to be safely discharged as well as medications of convalescent plasma and remdesivir - her father is currently hospitalized here as well, but doing worse - mother at home with COVID19    ROS:   A comprehensive review of systems was negative except for: tired, shortness of breath on exertion . Objective:    BP (!) 163/94   Pulse 83   Temp 98 °F (36.7 °C) (Oral)   Resp 20   Ht 5' 3\" (1.6 m) Comment: 5 feet 3 and 1/4 inches  Wt (!) 439 lb 8 oz (199.4 kg)   LMP 12/23/2020   SpO2 90%   BMI 77.85 kg/m²     Gen: obese, NAD  HEENT: NC/AT, moist mucous membranes, no oropharyngeal erythema or exudate  Neck: supple, trachea midline, no anterior cervical or SC LAD  Heart:  Normal s1/s2, RRR, no murmurs, gallops, or rubs. chronic leg edema  Lungs:  diminished bilaterally, no wheeze, no rales, no rhonchi, no crackles, pos use of accessory muscles with exertion  Abd: bowel sounds present, soft, nontender, nondistended, no masses  Extrem:  No clubbing, cyanosis,  Chronic leg edema  Skin: no rashes or lesions  Psych: A & O x3  Neuro: grossly intact, moves all four extremities.       Assessment:    Principal Problem:    Pneumonia due to COVID-19 virus  Active Problems:    Hypothyroidism    Super-super obese ([facility] [address]    Acute respiratory failure due to COVID-19 [facility])    Essential hypertension    Sepsis ([facility] [address]  Resolved Problems:    * No resolved hospital problems. *      Plan:  1. COVID19 pneumonia - remdesivir, convalescent plasma, steroids, supportive care - adding MVI cocktail  2. Acute resp failure with hypoxia due to COVID19 - wean as tolerated  3. HTN - add spironolactone back into regimen and continue with diltiazem  4. Sepsis POA based on hypoxic requiring 1-2L oxygen, lymphopenia, transaminitis, elevated D dimer and COVID19 positive  5. Super super Obesity, Body mass index is Body mass index is 77.85 kg/m². .  [] Class I - 30.0 to 34.9 kg/m2  [] Class II - 35.0 to 39.9 kg/m2  [] Class III - ?40 kg/m2 (also referred to as severe, extreme, morbid or massive obesity)   [] Super Obesity  - > 50% kg/m2  [x] Super Super Obesity  - > 52% kg/m2  Complicating assessment and treatment. Obesity Places the patient at extreme risk for multiple co-morbidities as well as early death and may be contributing to the patient's presentation. Supportive care. Encourage therapeutic lifestyle changes. Continue current regimen/therapies. Monitor. Adjust medical regimen as appropriate. Prognosis:  Fair    Code status:  Full code    DVT prophylaxis: Lovenox  GI prophylaxis: H2 blocker  Antibiotic prophylaxis indicated:   no  Diet:  DIET CARDIAC;     Disposition:  Home with home health    Medications:  Scheduled Meds:   famotidine  20 mg Oral BID    thiamine  100 mg Oral Daily    vitamin C  500 mg Oral TID    zinc sulfate  50 mg Oral BID    spironolactone  25 mg Oral Daily    enoxaparin  40 mg Subcutaneous BID    methylPREDNISolone  40 mg Intravenous Q12H    Vitamin D  2,000 Units Oral Daily    remdesivir IVPB  100 mg Intravenous Q24H    levothyroxine  112 mcg Oral Daily    DULoxetine  30 mg Oral Daily    lamoTRIgine  50 mg Oral Daily    fluticasone  2 spray Each Nostril Daily    dilTIAZem  120 mg Oral Daily       PRN Meds:  albuterol sulfate HFA, sodium chloride, sodium chloride flush, potassium chloride **OR** potassium alternative oral replacement **OR** potassium chloride, magnesium sulfate, promethazine **OR** ondansetron, acetaminophen **OR** acetaminophen, guaiFENesin-dextromethorphan, sodium chloride, benzonatate    IV:   sodium chloride           Intake/Output Summary (Last 24 hours) at 12/27/2020 0903  Last data filed at 12/27/2020 9876  Gross per 24 hour   Intake 286.08 ml   Output 1575 ml   Net -1288.92 ml       Results:  CBC:   Recent Labs     12/26/20 1922 12/27/20 0432   WBC 2.8* 2.3*   HGB 12.8 12.9   HCT 40.3 40.7   MCV 78.9* 77.4*    176     BMP:   Recent Labs     12/26/20 1922 12/27/20 0432    135*   K 5.6* 4.5    101   CO2 27 27   BUN 13 12   CREATININE 0.9 0.9     Mag: No results for input(s): MAG in the last 72 hours.   Phos: No results found for: PHOS  No components found for: GLU    LIVER PROFILE:   Recent Labs     12/26/20 1922 12/27/20 0432   AST 56* 42*   ALT 50* 54*   LIPASE 22.0  --    BILITOT 0.3 0.3   ALKPHOS 59 67     PT/INR:   Recent Labs     12/26/20 1851 12/27/20 0432   PROTIME 11.7 11.3   INR 1.01 0.97     APTT:   Recent Labs     12/27/20 0432   APTT 34.9     UA:  Recent Labs     12/26/20 2000   COLORU Yellow   PHUR 6.0   WBCUA 3-5   RBCUA 0-2   BACTERIA 2+*   CLARITYU Clear   SPECGRAV 1.025   LEUKOCYTESUR Negative   UROBILINOGEN 0.2   BILIRUBINUR Negative   BLOODU TRACE-LYSED*   GLUCOSEU Negative       Invalid input(s): ABG  Lab Results   Component Value Date    CALCIUM 9.3 12/27/2020               Electronically signed by Alejandra Pal MD on 12/27/2020 at 9:03 AM

## 2020-12-27 NOTE — PROGRESS NOTES
Paged Dr. Juliana Travis via perfect serve @ [phone] PM with the following home medication list:    Diltiazem 180 mg PO once daily in AM - last taken 12/26  Spironolactone 50 mg PO once daily in AM - last taken 12/26  Levothyroxine 112 mcg PO once daily in AM - las taken 12/26  Duloxetine 30 mg PO once daily in AM - last taken 12/26   Lamotrigine 25 mg PO twice daily (pt takes both tablets every morning equaling 50mg at a time) - last taken 12/26  Pt also uses Flonase daily when visiting [facility], last taken 12/26; pt also states she takes a medication for sleep as needed, but cannot remember what it is called.

## 2020-12-27 NOTE — ED PROVIDER NOTES
[facility]  ED  EMERGENCY DEPARTMENT ENCOUNTER        Pt Name: Heather Castaneda  MRN: [id number]  [place] 1975  Date of evaluation: 12/26/2020  Provider: Drena Brittle, PA-C  PCP: No primary care provider on file. ABEL. I have evaluated this patient. My supervising physician was available for consultation. Lokesh Estrada MD      CHIEF COMPLAINT       Chief Complaint   Patient presents with    Shortness of Breath     pt to ED from home with c/o SOB. pt reports that 2 members of her family are COVID +. pt tested with rapid test 10 days ago and came back negaitve. pt not feeling well for 8-10 days       HISTORY OF PRESENT ILLNESS   (Location, Timing/Onset, Context/Setting, Quality, Duration, Modifying Factors, Severity, Associated Signs and Symptoms)  Note limiting factors. Heather Castaneda is a 39 y.o. female patient presenting with complaint shortness of breath. The patient reports symptoms began 10 days ago with complaint headache, fatigue, myalgia, change in taste and smell, nausea. She does state both parents Covid positive. She states she was seen 7 days ago at local urgent care with a negative Covid test.  She reports no history of COPD or asthma. She does not smoke. She is morbidly obese. She appears slightly short of breath. She does speak in complete sentences. Patient ambulating back to the room her oximetry was 86%. She was placed on 2 L O2 and improves her O2 saturations up into the mid 90%'s. Nursing staff informs me that without oxygen and sitting in the bed she desaturates to 86%. The patient reports last Tylenol 1000 mg at 2 PM or approximately 5 hours ago. She is currently afebrile at 98.6. She is not tachycardic and she is not tachypneic. The patient's current weight is 440 pounds with a BMI score 77.94. Nursing Notes were all reviewed and agreed with or any disagreements were addressed in the HPI.     REVIEW OF SYSTEMS    (2-9 systems for level 4, 10 or more for level 5)     Review of Systems    Positives and Pertinent negatives as per HPI. Except as noted above in the ROS, all other systems were reviewed and negative. PAST MEDICAL HISTORY     Past Medical History:   Diagnosis Date    Allergic rhinitis     Depression     Hypertension     Hypothyroidism          SURGICAL HISTORY     Past Surgical History:   Procedure Laterality Date    CHOLECYSTECTOMY  2006    LAP BAND      LAP BAND  2010    TONSILLECTOMY  11/23/11         CURRENTMEDICATIONS       Previous Medications    ALBUTEROL SULFATE HFA (PROVENTIL HFA) 108 (90 BASE) MCG/ACT INHALER    Inhale 2 puffs into the lungs every 6 hours as needed for Wheezing or Shortness of Breath    ATOMOXETINE (STRATTERA) 80 MG CAPSULE    Take 80 mg by mouth daily. BUSPIRONE (BUSPAR) 15 MG TABLET    Take 15 mg by mouth 2 times daily as needed. CETIRIZINE (ZYRTEC) 10 MG TABLET    Take 10 mg by mouth daily. CHOLECALCIFEROL (VITAMIN D3) 5000 UNITS CAPS    Take 10,000 Units by mouth daily. CYANOCOBALAMIN (VITAMIN B-12) 2500 MCG SUBL    Place 2,500 mcg under the tongue daily. FERROUS SULFATE 50 MG CPCR    Take 1 tablet by mouth daily. FLUOXETINE (PROZAC) 40 MG CAPSULE    Take 80 mg by mouth daily. FLUTICASONE (FLOVENT HFA) 44 MCG/ACT INHALER    Inhale 2 puffs into the lungs 2 times daily. LEVOTHYROXINE (SYNTHROID) 150 MCG TABLET    TAKE ONE TABLET BY MOUTH DAILY    METFORMIN (GLUCOPHAGE) 500 MG TABLET    Take 500 mg by mouth 2 times daily (with meals). METHYLPREDNISOLONE (MEDROL, FLORES,) 4 MG TABLET    Take by mouth. MULTIPLE VITAMIN (MULTI-VITAMIN DAILY) TABS    Take  by mouth.     PREDNISONE (DELTASONE) 10 MG TABLET    3 tabs BID x 2 days,3 am and 2 pm x 2 days,2 BID for 2 days,2 am and 1 pm x 2 days,1 BID x 2 days, 1 qd x 2 days      SPACER/AERO-HOLDING CHAMBERS (AEROCHAMBER) MISC    Use with inhaler as directed    TRAZODONE (DESYREL) 50 MG TABLET    1/2 to 1 at HS ALLERGIES     Bactrim, Biaxin [clarithromycin], and Erythromycin    FAMILYHISTORY       Family History   Problem Relation Age of Onset    High Blood Pressure Mother     Other Mother         mitral valve prolapse    Psoriasis Mother     High Blood Pressure Brother     High Blood Pressure Brother     Rheum Arthritis Maternal Grandfather     Colon Cancer Maternal Grandfather 72    Cancer Maternal Grandfather         melanoma    Colon Cancer Maternal Grandmother 62    Colon Cancer Paternal Grandmother 62          SOCIAL HISTORY       Social History     Tobacco Use    Smoking status: Never Smoker    Smokeless tobacco: Never Used   Substance Use Topics    Alcohol use: No    Drug use: No       SCREENINGS    [place] Coma Scale  Eye Opening: Spontaneous  Best Verbal Response: Oriented  Best Motor Response: Obeys commands  [place] Coma Scale Score: 15        PHYSICAL EXAM    (up to 7 for level 4, 8 or more for level 5)     ED Triage Vitals [12/26/20 1733]   BP Temp Temp Source Pulse Resp SpO2 Height Weight   (!) 175/92 98.7 °F (37.1 °C) Oral 83 18 95 % 5' 3\" (1.6 m) (!) 440 lb (199.6 kg)       Physical Exam  Vitals signs and nursing note reviewed. Constitutional:       Appearance: Normal appearance. She is well-developed. She is obese. HENT:      Head: Normocephalic and atraumatic. Right Ear: External ear normal.      Left Ear: External ear normal.   Eyes:      General: No scleral icterus. Right eye: No discharge. Left eye: No discharge. Conjunctiva/sclera: Conjunctivae normal.   Neck:      Musculoskeletal: Normal range of motion and neck supple. Cardiovascular:      Rate and Rhythm: Normal rate and regular rhythm. Heart sounds: Normal heart sounds. Pulmonary:      Effort: Pulmonary effort is normal.      Breath sounds: Normal breath sounds. Musculoskeletal: Normal range of motion. Right lower leg: No edema. Left lower leg: No edema.       Comments: Patient's lower legs at baseline. Skin:     General: Skin is warm and dry. Neurological:      General: No focal deficit present. Mental Status: She is alert and oriented to person, place, and time. Mental status is at baseline. Psychiatric:         Mood and Affect: Mood normal.         Behavior: Behavior normal.         Thought Content:  Thought content normal.         Judgment: Judgment normal.         DIAGNOSTIC RESULTS   LABS:    Labs Reviewed   FIBRINOGEN - Abnormal; Notable for the following components:       Result Value    Fibrinogen 471 (*)     All other components within normal limits    Narrative:     Tamara Tian tel. [phone],  Rejected Test Name/Called to: Cathy Elizabeth RN; SEE COMMENT FOR TESTS,  12/26/2020 19:11, by Ermias Chaidez  Performed at:  [address] [facility],  [place], [place]   Phone [phone]   URINE RT REFLEX TO CULTURE - Abnormal; Notable for the following components:    Blood, Urine TRACE-LYSED (*)     All other components within normal limits    Narrative:     Performed at:  [address],  [place], [place]   Phone [phone] - Abnormal; Notable for the following components:    SARS-CoV-2, NAAT DETECTED (*)     All other components within normal limits    Narrative:     Tamara Tian tel. [phone],  Microbiology results called to and read back by Alistair Frias RN,  12/26/2020 19:42, by Ermias Chaidez  Performed at:  [address],  [place], [place]   Phone [phone]   CBC WITH AUTO DIFFERENTIAL - Abnormal; Notable for the following components:    WBC 2.8 (*)     MCV 78.9 (*)     MCH 25.0 (*)     RDW 17.8 (*)     Lymphocytes Absolute 0.6 (*)     All other components within normal limits    Narrative:     Performed at:  [address],  [place], [place]   Phone   [address], [place]   Phone [phone]   LACTATE DEHYDROGENASE   LIPASE   COMPREHENSIVE METABOLIC PANEL W/ REFLEX TO MG FOR LOW K   C-REACTIVE PROTEIN   VITAMIN D 25 HYDROXY   FERRITIN   D-DIMER, QUANTITATIVE   TYPE AND SCREEN       All other labs were within normal range or not returned as of this dictation. EKG: All EKG's are interpreted by the Emergency Department Physician in the absence of a cardiologist.  Please see their note for interpretation of EKG. RADIOLOGY:   Non-plain film images such as CT, Ultrasound and MRI are read by the radiologist. Plain radiographic images are visualized and preliminarily interpreted by the ED Provider with the below findings:        Interpretation per the Radiologist below, if available at the time of this note:    XR CHEST PORTABLE   Final Result   Bilateral pulmonary opacification most consistent with a multifocal pneumonia   including atypical viral pneumonia. Xr Chest Portable    Result Date: 12/26/2020  EXAMINATION: ONE XRAY VIEW OF THE CHEST 12/26/2020 6:21 pm COMPARISON: 01/14/2014. HISTORY: ORDERING SYSTEM PROVIDED HISTORY: Shortness of breath TECHNOLOGIST PROVIDED HISTORY: Reason for exam:->Shortness of breath Reason for Exam: sob +covid Acuity: Acute Type of Exam: Initial FINDINGS: Study is mildly limited by portable technique and patient's body habitus. Diffuse bilateral pulmonary opacification, likely a multifocal pneumonia. There is no focal consolidation or significant pleural fluid. The cardiac silhouette is mildly prominent, likely accentuated by technique. Bilateral pulmonary opacification most consistent with a multifocal pneumonia including atypical viral pneumonia. PROCEDURES   Unless otherwise noted below, none     Procedures    CRITICAL CARE TIME   Critical Care  There was a high probability of life-threatening clinical deterioration in the patient's condition requiring my urgent intervention.   Total critical Morbid obesity ([facility] [address]          DISPOSITION/PLAN   DISPOSITION        PATIENT REFERREDTO:  No follow-up provider specified. DISCHARGE MEDICATIONS:  New Prescriptions    No medications on file       DISCONTINUED MEDICATIONS:  Discontinued Medications    No medications on file              (Please note that portions of this note were completed with a voice recognition program.  Efforts were made to edit the dictations but occasionally words are mis-transcribed. )    Esau Trujillo PA-C (electronically signed)           Esau Trujillo PA-C  12/26/20 8172

## 2020-12-28 LAB
A/G RATIO: 1.1 (ref 1.1–2.2)
ALBUMIN SERPL-MCNC: 4.1 G/DL (ref 3.4–5)
ALP BLD-CCNC: 59 U/L (ref 40–129)
ALT SERPL-CCNC: 54 U/L (ref 10–40)
ANION GAP SERPL CALCULATED.3IONS-SCNC: 7 MMOL/L (ref 3–16)
APTT: 33.5 SEC (ref 24.2–36.2)
AST SERPL-CCNC: 33 U/L (ref 15–37)
BASOPHILS ABSOLUTE: 0 K/UL (ref 0–0.2)
BASOPHILS RELATIVE PERCENT: 0.5 %
BILIRUB SERPL-MCNC: 0.3 MG/DL (ref 0–1)
BUN BLDV-MCNC: 14 MG/DL (ref 7–20)
CALCIUM SERPL-MCNC: 9.4 MG/DL (ref 8.3–10.6)
CHLORIDE BLD-SCNC: 99 MMOL/L (ref 99–110)
CO2: 28 MMOL/L (ref 21–32)
CREAT SERPL-MCNC: 0.8 MG/DL (ref 0.6–1.1)
D DIMER: 475 NG/ML DDU (ref 0–229)
EOSINOPHILS ABSOLUTE: 0 K/UL (ref 0–0.6)
EOSINOPHILS RELATIVE PERCENT: 0 %
FIBRINOGEN: 442 MG/DL (ref 200–397)
GFR AFRICAN AMERICAN: >60
GFR NON-AFRICAN AMERICAN: >60
GLOBULIN: 3.8 G/DL
GLUCOSE BLD-MCNC: 136 MG/DL (ref 70–99)
GLUCOSE BLD-MCNC: 172 MG/DL (ref 70–99)
GLUCOSE BLD-MCNC: 175 MG/DL (ref 70–99)
GLUCOSE BLD-MCNC: 187 MG/DL (ref 70–99)
GLUCOSE BLD-MCNC: 238 MG/DL (ref 70–99)
HCT VFR BLD CALC: 40.3 % (ref 36–48)
HEMOGLOBIN: 12.8 G/DL (ref 12–16)
INR BLD: 1.05 (ref 0.86–1.14)
LYMPHOCYTES ABSOLUTE: 0.4 K/UL (ref 1–5.1)
LYMPHOCYTES RELATIVE PERCENT: 8.6 %
MCH RBC QN AUTO: 24.6 PG (ref 26–34)
MCHC RBC AUTO-ENTMCNC: 31.6 G/DL (ref 31–36)
MCV RBC AUTO: 77.7 FL (ref 80–100)
MONOCYTES ABSOLUTE: 0.1 K/UL (ref 0–1.3)
MONOCYTES RELATIVE PERCENT: 2.3 %
NEUTROPHILS ABSOLUTE: 4.6 K/UL (ref 1.7–7.7)
NEUTROPHILS RELATIVE PERCENT: 88.6 %
PDW BLD-RTO: 17.8 % (ref 12.4–15.4)
PERFORMED ON: ABNORMAL
PLATELET # BLD: 199 K/UL (ref 135–450)
PMV BLD AUTO: 6.3 FL (ref 5–10.5)
POTASSIUM REFLEX MAGNESIUM: 4.5 MMOL/L (ref 3.5–5.1)
PROTHROMBIN TIME: 12.2 SEC (ref 10–13.2)
RBC # BLD: 5.19 M/UL (ref 4–5.2)
SODIUM BLD-SCNC: 134 MMOL/L (ref 136–145)
TOTAL PROTEIN: 7.9 G/DL (ref 6.4–8.2)
WBC # BLD: 5.2 K/UL (ref 4–11)

## 2020-12-28 PROCEDURE — 85610 PROTHROMBIN TIME: CPT

## 2020-12-28 PROCEDURE — 2700000000 HC OXYGEN THERAPY PER DAY

## 2020-12-28 PROCEDURE — 6360000002 HC RX W HCPCS: Performed by: INTERNAL MEDICINE

## 2020-12-28 PROCEDURE — 6370000000 HC RX 637 (ALT 250 FOR IP): Performed by: NURSE PRACTITIONER

## 2020-12-28 PROCEDURE — 2580000003 HC RX 258: Performed by: INTERNAL MEDICINE

## 2020-12-28 PROCEDURE — 6370000000 HC RX 637 (ALT 250 FOR IP): Performed by: INTERNAL MEDICINE

## 2020-12-28 PROCEDURE — 2500000003 HC RX 250 WO HCPCS: Performed by: INTERNAL MEDICINE

## 2020-12-28 PROCEDURE — 85025 COMPLETE CBC W/AUTO DIFF WBC: CPT

## 2020-12-28 PROCEDURE — 36415 COLL VENOUS BLD VENIPUNCTURE: CPT

## 2020-12-28 PROCEDURE — 85384 FIBRINOGEN ACTIVITY: CPT

## 2020-12-28 PROCEDURE — 85730 THROMBOPLASTIN TIME PARTIAL: CPT

## 2020-12-28 PROCEDURE — 94761 N-INVAS EAR/PLS OXIMETRY MLT: CPT

## 2020-12-28 PROCEDURE — 80053 COMPREHEN METABOLIC PANEL: CPT

## 2020-12-28 PROCEDURE — 85379 FIBRIN DEGRADATION QUANT: CPT

## 2020-12-28 PROCEDURE — 1200000000 HC SEMI PRIVATE

## 2020-12-28 RX ORDER — BUTALBITAL, ACETAMINOPHEN AND CAFFEINE 50; 325; 40 MG/1; MG/1; MG/1
1 TABLET ORAL EVERY 4 HOURS PRN
Status: DISCONTINUED | OUTPATIENT
Start: 2020-12-28 | End: 2020-12-29 | Stop reason: HOSPADM

## 2020-12-28 RX ORDER — TRAMADOL HYDROCHLORIDE 50 MG/1
50 TABLET ORAL ONCE
Status: COMPLETED | OUTPATIENT
Start: 2020-12-28 | End: 2020-12-28

## 2020-12-28 RX ADMIN — FAMOTIDINE 20 MG: 20 TABLET ORAL at 08:38

## 2020-12-28 RX ADMIN — INSULIN LISPRO 1 UNITS: 100 INJECTION, SOLUTION INTRAVENOUS; SUBCUTANEOUS at 09:07

## 2020-12-28 RX ADMIN — FAMOTIDINE 20 MG: 20 TABLET ORAL at 21:19

## 2020-12-28 RX ADMIN — BENZONATATE 200 MG: 100 CAPSULE ORAL at 14:37

## 2020-12-28 RX ADMIN — SODIUM CHLORIDE 30 ML: 9 INJECTION, SOLUTION INTRAVENOUS at 00:05

## 2020-12-28 RX ADMIN — DULOXETINE HYDROCHLORIDE 30 MG: 30 CAPSULE, DELAYED RELEASE ORAL at 08:38

## 2020-12-28 RX ADMIN — ENOXAPARIN SODIUM 40 MG: 40 INJECTION SUBCUTANEOUS at 21:19

## 2020-12-28 RX ADMIN — METHYLPREDNISOLONE SODIUM SUCCINATE 40 MG: 40 INJECTION, POWDER, FOR SOLUTION INTRAMUSCULAR; INTRAVENOUS at 08:38

## 2020-12-28 RX ADMIN — TRAMADOL HYDROCHLORIDE 50 MG: 50 TABLET, FILM COATED ORAL at 00:21

## 2020-12-28 RX ADMIN — BUTALBITAL, ACETAMINOPHEN, AND CAFFEINE 1 TABLET: 50; 325; 40 TABLET ORAL at 14:37

## 2020-12-28 RX ADMIN — BUTALBITAL, ACETAMINOPHEN, AND CAFFEINE 1 TABLET: 50; 325; 40 TABLET ORAL at 23:24

## 2020-12-28 RX ADMIN — ACETAMINOPHEN 650 MG: 325 TABLET ORAL at 05:46

## 2020-12-28 RX ADMIN — BENZONATATE 200 MG: 100 CAPSULE ORAL at 23:25

## 2020-12-28 RX ADMIN — ENOXAPARIN SODIUM 40 MG: 40 INJECTION SUBCUTANEOUS at 08:38

## 2020-12-28 RX ADMIN — SPIRONOLACTONE 25 MG: 25 TABLET ORAL at 08:38

## 2020-12-28 RX ADMIN — ZINC SULFATE 220 MG (50 MG) CAPSULE 50 MG: CAPSULE at 23:26

## 2020-12-28 RX ADMIN — Medication 100 MG: at 09:03

## 2020-12-28 RX ADMIN — Medication 2000 UNITS: at 08:38

## 2020-12-28 RX ADMIN — DILTIAZEM HYDROCHLORIDE 120 MG: 120 CAPSULE, COATED, EXTENDED RELEASE ORAL at 05:46

## 2020-12-28 RX ADMIN — INSULIN LISPRO 1 UNITS: 100 INJECTION, SOLUTION INTRAVENOUS; SUBCUTANEOUS at 20:20

## 2020-12-28 RX ADMIN — BUTALBITAL, ACETAMINOPHEN, AND CAFFEINE 1 TABLET: 50; 325; 40 TABLET ORAL at 10:14

## 2020-12-28 RX ADMIN — BUTALBITAL, ACETAMINOPHEN, AND CAFFEINE 1 TABLET: 50; 325; 40 TABLET ORAL at 18:35

## 2020-12-28 RX ADMIN — ACETAMINOPHEN 650 MG: 325 TABLET ORAL at 21:17

## 2020-12-28 RX ADMIN — FLUTICASONE PROPIONATE 2 SPRAY: 50 SPRAY, METERED NASAL at 08:39

## 2020-12-28 RX ADMIN — OXYCODONE HYDROCHLORIDE AND ACETAMINOPHEN 500 MG: 500 TABLET ORAL at 23:27

## 2020-12-28 RX ADMIN — INSULIN LISPRO 2 UNITS: 100 INJECTION, SOLUTION INTRAVENOUS; SUBCUTANEOUS at 12:05

## 2020-12-28 RX ADMIN — BENZONATATE 200 MG: 100 CAPSULE ORAL at 05:53

## 2020-12-28 RX ADMIN — SODIUM CHLORIDE, PRESERVATIVE FREE 10 ML: 5 INJECTION INTRAVENOUS at 08:38

## 2020-12-28 RX ADMIN — LEVOTHYROXINE SODIUM 112 MCG: 0.11 TABLET ORAL at 05:46

## 2020-12-28 RX ADMIN — LAMOTRIGINE 50 MG: 25 TABLET ORAL at 08:38

## 2020-12-28 RX ADMIN — ZINC SULFATE 220 MG (50 MG) CAPSULE 50 MG: CAPSULE at 09:03

## 2020-12-28 ASSESSMENT — PAIN SCALES - GENERAL
PAINLEVEL_OUTOF10: 5
PAINLEVEL_OUTOF10: 9
PAINLEVEL_OUTOF10: 7
PAINLEVEL_OUTOF10: 6
PAINLEVEL_OUTOF10: 5
PAINLEVEL_OUTOF10: 9
PAINLEVEL_OUTOF10: 6
PAINLEVEL_OUTOF10: 0
PAINLEVEL_OUTOF10: 6

## 2020-12-28 ASSESSMENT — PAIN - FUNCTIONAL ASSESSMENT: PAIN_FUNCTIONAL_ASSESSMENT: ACTIVITIES ARE NOT PREVENTED

## 2020-12-28 ASSESSMENT — PAIN DESCRIPTION - ONSET: ONSET: GRADUAL

## 2020-12-28 ASSESSMENT — PAIN DESCRIPTION - PAIN TYPE: TYPE: ACUTE PAIN

## 2020-12-28 ASSESSMENT — PAIN DESCRIPTION - PROGRESSION: CLINICAL_PROGRESSION: NOT CHANGED

## 2020-12-28 ASSESSMENT — PAIN DESCRIPTION - LOCATION: LOCATION: HEAD

## 2020-12-28 ASSESSMENT — PAIN DESCRIPTION - DESCRIPTORS: DESCRIPTORS: HEADACHE;THROBBING

## 2020-12-28 ASSESSMENT — PAIN DESCRIPTION - FREQUENCY: FREQUENCY: CONTINUOUS

## 2020-12-28 NOTE — PLAN OF CARE
Problem: Falls - Risk of:  Goal: Will remain free from falls  Description: Will remain free from falls  12/28/2020 1810 by Cedrick Payne RN  Outcome: Ongoing  12/28/2020 1810 by Cedrick Payne RN  Outcome: Ongoing  Goal: Absence of physical injury  Description: Absence of physical injury  12/28/2020 1810 by Cedrick Payne RN  Outcome: Ongoing  12/28/2020 1810 by Cedrick Payne RN  Outcome: Ongoing     Problem: Airway Clearance - Ineffective  Goal: Achieve or maintain patent airway  12/28/2020 1810 by Cedrick Payne RN  Outcome: Ongoing  12/28/2020 1810 by Cedrick Payne RN  Outcome: Ongoing     Problem: Gas Exchange - Impaired  Goal: Absence of hypoxia  12/28/2020 1810 by Cedrick Payne RN  Outcome: Ongoing  12/28/2020 1810 by Cedrick Payne RN  Outcome: Ongoing  Goal: Promote optimal lung function  12/28/2020 1810 by Cedrick Payne RN  Outcome: Ongoing  12/28/2020 1810 by Cedrick Payne RN  Outcome: Ongoing     Problem: Breathing Pattern - Ineffective  Goal: Ability to achieve and maintain a regular respiratory rate  12/28/2020 1810 by Cedrick Payne RN  Outcome: Ongoing  12/28/2020 1810 by Cedrick Payne RN  Outcome: Ongoing     Problem:  Body Temperature -  Risk of, Imbalanced  Goal: Ability to maintain a body temperature within defined limits  12/28/2020 1810 by Cedrick Payne RN  Outcome: Ongoing  12/28/2020 1810 by Cedrick Payne RN  Outcome: Ongoing  Goal: Will regain or maintain usual level of consciousness  12/28/2020 1810 by Cedrick Payne RN  Outcome: Ongoing  12/28/2020 1810 by Cedrick Payne RN  Outcome: Ongoing  Goal: Complications related to the disease process, condition or treatment will be avoided or minimized  12/28/2020 1810 by Cedrick Payne RN  Outcome: Ongoing  12/28/2020 1810 by Cedrick Payne RN  Outcome: Ongoing     Problem: Isolation Precautions - Risk of Spread of Infection  Goal: Prevent transmission of infection  12/28/2020 1810 by Cedrick Payne RN  Outcome: Ongoing  12/28/2020 1810 by Tabitha Winslow RN  Outcome: Ongoing     Problem: Nutrition Deficits  Goal: Optimize nutrtional status  12/28/2020 1810 by Tabitha Winslow RN  Outcome: Ongoing  12/28/2020 1810 by Tabitha Winslow RN  Outcome: Ongoing     Problem: Risk for Fluid Volume Deficit  Goal: Maintain normal heart rhythm  12/28/2020 1810 by Tabitha Winslow RN  Outcome: Ongoing  12/28/2020 1810 by Tabitha Winslow RN  Outcome: Ongoing  Goal: Maintain absence of muscle cramping  12/28/2020 1810 by Tabitha Winslow RN  Outcome: Ongoing  12/28/2020 1810 by Tabitha Winslow RN  Outcome: Ongoing  Goal: Maintain normal serum potassium, sodium, calcium, phosphorus, and pH  12/28/2020 1810 by Tabitha Winslow RN  Outcome: Ongoing  12/28/2020 1810 by Tabitha Winslow RN  Outcome: Ongoing     Problem: Loneliness or Risk for Loneliness  Goal: Demonstrate positive use of time alone when socialization is not possible  12/28/2020 1810 by Tabitha Winslow RN  Outcome: Ongoing  12/28/2020 1810 by Tabitha Winslow RN  Outcome: Ongoing     Problem: Fatigue  Goal: Verbalize increase energy and improved vitality  12/28/2020 1810 by Tabitha Winslow RN  Outcome: Ongoing  12/28/2020 1810 by Tabitha Winslow RN  Outcome: Ongoing     Problem: Patient Education: Go to Patient Education Activity  Goal: Patient/Family Education  12/28/2020 1810 by Tabitha Winslow RN  Outcome: Ongoing  12/28/2020 1810 by Tabitha Winslow RN  Outcome: Ongoing     Problem: Pain:  Goal: Pain level will decrease  Description: Pain level will decrease  12/28/2020 1810 by Tabitha Winslow RN  Outcome: Ongoing  12/28/2020 1810 by Tabitha Winslow RN  Outcome: Ongoing  Goal: Control of acute pain  Description: Control of acute pain  12/28/2020 1810 by Tabitha Winslow RN  Outcome: Ongoing  12/28/2020 1810 by Tabitha Winslow RN  Outcome: Ongoing  Goal: Control of chronic pain  Description: Control of chronic pain  12/28/2020 1810 by Tabitha Winslow RN  Outcome: Ongoing  12/28/2020 1810 by Princess Roth RN  Outcome: Ongoing

## 2020-12-28 NOTE — CARE COORDINATION
CASE MANAGEMENT INITIAL ASSESSMENT      Reviewed chart and completed assessment via telephone with: To patient  Explained Case Management role/services. To patient    Primary contact information:See below    Health Care Decision Maker :   Primary Decision Maker: Tavo Napoles - Parent - [phone]    Secondary Decision Maker: Mame Cortez - Brother/Sister - [phone]          Can this person be reached and be able to respond quickly, such as within a few minutes or hours? Yes    Admit date/status:Inpatient 12/26/2020  Diagnosis:Acute respiratory failure  Is this a Readmission?: No    Insurance:[facility]   Precert required for SNF: No       3 night stay required: No  Waived due to covid  Living arrangements, Adls, care needs, prior to admission:Lives at home in [place] was here visiting with family for holidays     Transportation:D    [facility] at home:  Walker__Cane__RTS__ BSC__Shower Chair__  02__ HHN__ CPAP__  [facility] Bed__ W/C___ Other__________    Services in the home and/or outpatient, prior to admission:None    PT[facility]OT recs:[facility]    Hospital Exemption Notification (HEN):N/A    Barriers to discharge: None identified     Plan/comments: To return home to mother's who lives here in [facility] [facility]. She denies needs and does not think she will need home care or rehab. She is currently on 3.5 liters of oxygen and is not on home O2. Will follow and monitor for oxygen needs or any barriers to discharge that may arise.      ECOC on chart for MD signature

## 2020-12-28 NOTE — ACP (ADVANCE CARE PLANNING)
Advance Care Planning     Advance Care Planning Activator (Inpatient)  Conversation Note      Date of ACP Conversation: 12/26/2020    Conversation Conducted with: Patient    ACP Activator: Luigi Tyler    *When Decision Maker makes decisions on behalf of the incapacitated patient: Decision Maker is asked to consider and make decisions based on patient values, known preferences, or best interests. Health Care Decision Maker:     Current Designated Health Care Decision Maker:   Primary Decision Maker: Judd Gill - Parent - [phone]    Secondary Decision Maker: Phillip Curl - Brother/Sister - [phone]   Validated  this information as still accurate & up-to-date    Care Preferences    Ventilation: \"If you were in your present state of health and suddenly became very ill and were unable to breathe on your own, what would your preference be about the use of a ventilator (breathing machine) if it were available to you? \"      Would the patient desire the use of ventilator (breathing machine)?: yes    \"If your health worsens and it becomes clear that your chance of recovery is unlikely, what would your preference be about the use of a ventilator (breathing machine) if it were available to you? \"     Would the patient desire the use of ventilator (breathing machine)?: Yes      Resuscitation  \"CPR works best to restart the heart when there is a sudden event, like a heart attack, in someone who is otherwise healthy. Unfortunately, CPR does not typically restart the heart for people who have serious health conditions or who are very sick. \"    \"In the event your heart stopped as a result of an underlying serious health condition, would you want attempts to be made to restart your heart (answer \"yes\" for attempt to resuscitate) or would you prefer a natural death (answer \"no\" for do not attempt to resuscitate)? \" yes     Writer spoke with patient and she was clear in the event of the emergency she would want everything done and aggressive measures to sustain her life.

## 2020-12-28 NOTE — FLOWSHEET NOTE
12/28/20 0830   Assessment   Charting Type Shift assessment   Neurological   Neuro (WDL) WDL   Level of Consciousness Alert (0)   [place] Coma Scale   Eye Opening 4   Best Verbal Response 5   Best Motor Response 6   [place] Coma Scale Score 15   NIH/MNHISS Stroke Scale   NIH/MNIHSS Stroke Scale Assessed No   HEENT   HEENT (WDL) WDL   Respiratory   Respiratory (WDL) X  (COVID)   Respiratory Pattern Regular   Respiratory Depth Normal;Deep   Respiratory Quality/Effort Dyspnea with exertion   Chest Assessment Trachea midline; Chest expansion symmetrical   L Breath Sounds Diminished   R Breath Sounds Diminished   Cardiac   Cardiac (WDL) WDL   Rhythm Interpretation   Pulse 74   Cardiac Monitor   Telemetry Monitor On Yes   Telemetry Audible Yes   Telemetry Alarms Set Yes   Telemetry Box Number 69   Gastrointestinal   Abdominal (WDL) X   Abdomen Inspection Rotund   Tenderness No guarding;Nontender   RUQ Bowel Sounds Active; Audible   LUQ Bowel Sounds Active; Audible   RLQ Bowel Sounds Active; Audible   LLQ Bowel Sounds Active; Audible   GI Symptoms Loss of appetite   Peripheral Vascular   Peripheral Vascular (WDL) WDL   Edema None   Skin Color/Condition   Skin Color/Condition (WDL) X   Skin Integrity   Skin Integrity (WDL) X   Musculoskeletal   Musculoskeletal (WDL) WDL   Genitourinary   Genitourinary (WDL) WDL   Flank Tenderness No   Suprapubic Tenderness No   Dysuria No   Urine Assessment   Incontinence No   Urine Color Yellow/straw   Urine Appearance Clear   Urine Odor Malodorous   Anus/Rectum   Anus/Rectum (WDL) WDL  (per pt)   Psychosocial   Psychosocial (WDL) WDL

## 2020-12-28 NOTE — PROGRESS NOTES
Page sent to NP via perfect serve regarding request for pain medication. Awaiting orders/response. 12/27/20 11:45 PM   [phone] Hospital or Facility: [facility] From: Gricelda Gaston RE: Radha Part 1975 RM: 36 Pt c/o throbbing HA on left side of head, as well as chronic back pain. Rated pain 8/10 upon initial assessment. Tylenol given @ 2055. Currently rates pain 6/10. Pt requesting something stronger to help with acute HA and chronic back pain.  Need Callback: NO CALLBACK REQ C4 PROGRESSIVE CARE  Unread

## 2020-12-28 NOTE — PROGRESS NOTES
planned isolation through 8 Jan.  Currently on IV Steroids - continued. Remdesivir started 26 Dec.  S/P Plasma. PNA - likely viral 2nd to above w/ multifocal ASD on admission CXR. Tx as above. Acute Respiratory Failure - w/ hypoxia, POArrival 2nd to above. Supplemental O2 and wean as tolerated. Sepsis - ruled out w/out Leukocytosis/Tachycardia/Fever or Elevated Lactate POArrival.    HTN - w/out known CAD and no evidence of active signs/sxs of ischemia/failure. Currently controlled on home meds w/ vitals reviewed and documented as above. HypoThyroid - clinically euthyroid on oral replacement therapy. Continue, w/ outpt monitoring as previously arranged. DM2 - controlled on home oral antiGlycemics - held. Follow FSBS/SSI low regimen. Last HbA1c 5.2% dated June 2012. Anticipate resuming/continuing home regimen at discharge. Morbid Obesity -  With Body mass index is 77.25 kg/m². Complicating assessment and treatment. Placing patient at risk for multiple co-morbidities as well as early death and contributing to the patient's presentation. Counseled on weight loss. DVT Prophylaxis: LMWH/IPC  Diet: DIET CARDIAC;  Code Status: Full Code      PT/OT Eval Status: not yet ordered. Dispo - likely here several days pending clinical improvement.      Xander Serna MD

## 2020-12-29 VITALS
RESPIRATION RATE: 16 BRPM | HEIGHT: 63 IN | WEIGHT: 293 LBS | HEART RATE: 75 BPM | BODY MASS INDEX: 51.91 KG/M2 | DIASTOLIC BLOOD PRESSURE: 81 MMHG | SYSTOLIC BLOOD PRESSURE: 164 MMHG | TEMPERATURE: 98.1 F | OXYGEN SATURATION: 93 %

## 2020-12-29 LAB
A/G RATIO: 1.1 (ref 1.1–2.2)
ALBUMIN SERPL-MCNC: 4 G/DL (ref 3.4–5)
ALP BLD-CCNC: 54 U/L (ref 40–129)
ALT SERPL-CCNC: 185 U/L (ref 10–40)
ANION GAP SERPL CALCULATED.3IONS-SCNC: 8 MMOL/L (ref 3–16)
APTT: 30.2 SEC (ref 24.2–36.2)
AST SERPL-CCNC: 117 U/L (ref 15–37)
BASOPHILS ABSOLUTE: 0 K/UL (ref 0–0.2)
BASOPHILS RELATIVE PERCENT: 0.1 %
BILIRUB SERPL-MCNC: 0.3 MG/DL (ref 0–1)
BUN BLDV-MCNC: 15 MG/DL (ref 7–20)
CALCIUM SERPL-MCNC: 9.3 MG/DL (ref 8.3–10.6)
CHLORIDE BLD-SCNC: 101 MMOL/L (ref 99–110)
CO2: 28 MMOL/L (ref 21–32)
CREAT SERPL-MCNC: 0.8 MG/DL (ref 0.6–1.1)
D DIMER: 539 NG/ML DDU (ref 0–229)
EOSINOPHILS ABSOLUTE: 0 K/UL (ref 0–0.6)
EOSINOPHILS RELATIVE PERCENT: 0 %
FIBRINOGEN: 374 MG/DL (ref 200–397)
GFR AFRICAN AMERICAN: >60
GFR NON-AFRICAN AMERICAN: >60
GLOBULIN: 3.6 G/DL
GLUCOSE BLD-MCNC: 144 MG/DL (ref 70–99)
GLUCOSE BLD-MCNC: 152 MG/DL (ref 70–99)
GLUCOSE BLD-MCNC: 224 MG/DL (ref 70–99)
HCT VFR BLD CALC: 40.3 % (ref 36–48)
HEMOGLOBIN: 12.7 G/DL (ref 12–16)
INR BLD: 1.05 (ref 0.86–1.14)
LYMPHOCYTES ABSOLUTE: 0.6 K/UL (ref 1–5.1)
LYMPHOCYTES RELATIVE PERCENT: 10.1 %
MCH RBC QN AUTO: 24.7 PG (ref 26–34)
MCHC RBC AUTO-ENTMCNC: 31.6 G/DL (ref 31–36)
MCV RBC AUTO: 78.2 FL (ref 80–100)
MONOCYTES ABSOLUTE: 0.3 K/UL (ref 0–1.3)
MONOCYTES RELATIVE PERCENT: 4.6 %
NEUTROPHILS ABSOLUTE: 4.9 K/UL (ref 1.7–7.7)
NEUTROPHILS RELATIVE PERCENT: 85.2 %
PDW BLD-RTO: 17.9 % (ref 12.4–15.4)
PERFORMED ON: ABNORMAL
PERFORMED ON: ABNORMAL
PLATELET # BLD: 208 K/UL (ref 135–450)
PMV BLD AUTO: 6.6 FL (ref 5–10.5)
POTASSIUM REFLEX MAGNESIUM: 4.7 MMOL/L (ref 3.5–5.1)
PROTHROMBIN TIME: 12.2 SEC (ref 10–13.2)
RBC # BLD: 5.15 M/UL (ref 4–5.2)
SODIUM BLD-SCNC: 137 MMOL/L (ref 136–145)
TOTAL PROTEIN: 7.6 G/DL (ref 6.4–8.2)
WBC # BLD: 5.8 K/UL (ref 4–11)

## 2020-12-29 PROCEDURE — 36415 COLL VENOUS BLD VENIPUNCTURE: CPT

## 2020-12-29 PROCEDURE — 2700000000 HC OXYGEN THERAPY PER DAY

## 2020-12-29 PROCEDURE — 6370000000 HC RX 637 (ALT 250 FOR IP): Performed by: INTERNAL MEDICINE

## 2020-12-29 PROCEDURE — 6360000002 HC RX W HCPCS: Performed by: INTERNAL MEDICINE

## 2020-12-29 PROCEDURE — 85384 FIBRINOGEN ACTIVITY: CPT

## 2020-12-29 PROCEDURE — 6360000002 HC RX W HCPCS: Performed by: NURSE PRACTITIONER

## 2020-12-29 PROCEDURE — 85730 THROMBOPLASTIN TIME PARTIAL: CPT

## 2020-12-29 PROCEDURE — 85610 PROTHROMBIN TIME: CPT

## 2020-12-29 PROCEDURE — 6370000000 HC RX 637 (ALT 250 FOR IP): Performed by: NURSE PRACTITIONER

## 2020-12-29 PROCEDURE — 94761 N-INVAS EAR/PLS OXIMETRY MLT: CPT

## 2020-12-29 PROCEDURE — 85025 COMPLETE CBC W/AUTO DIFF WBC: CPT

## 2020-12-29 PROCEDURE — 80053 COMPREHEN METABOLIC PANEL: CPT

## 2020-12-29 PROCEDURE — 85379 FIBRIN DEGRADATION QUANT: CPT

## 2020-12-29 RX ORDER — DEXAMETHASONE 4 MG/1
10 TABLET ORAL ONCE
Status: COMPLETED | OUTPATIENT
Start: 2020-12-29 | End: 2020-12-29

## 2020-12-29 RX ORDER — TRAMADOL HYDROCHLORIDE 50 MG/1
50 TABLET ORAL ONCE
Status: COMPLETED | OUTPATIENT
Start: 2020-12-29 | End: 2020-12-29

## 2020-12-29 RX ORDER — PREDNISONE 20 MG/1
40 TABLET ORAL ONCE
Status: DISCONTINUED | OUTPATIENT
Start: 2020-12-29 | End: 2020-12-29

## 2020-12-29 RX ORDER — BENZONATATE 200 MG/1
200 CAPSULE ORAL 3 TIMES DAILY PRN
Qty: 20 CAPSULE | Refills: 0 | Status: SHIPPED | OUTPATIENT
Start: 2020-12-29 | End: 2021-01-05

## 2020-12-29 RX ORDER — DEXAMETHASONE 6 MG/1
6 TABLET ORAL
Qty: 7 TABLET | Refills: 0 | Status: SHIPPED | OUTPATIENT
Start: 2020-12-29 | End: 2021-01-05

## 2020-12-29 RX ORDER — SPIRONOLACTONE 50 MG/1
50 TABLET, FILM COATED ORAL DAILY
COMMUNITY

## 2020-12-29 RX ORDER — DILTIAZEM HYDROCHLORIDE 180 MG/1
180 CAPSULE, COATED, EXTENDED RELEASE ORAL DAILY
COMMUNITY

## 2020-12-29 RX ORDER — MECOBALAMIN 5000 MCG
5 TABLET,DISINTEGRATING ORAL NIGHTLY PRN
Status: DISCONTINUED | OUTPATIENT
Start: 2020-12-29 | End: 2020-12-29 | Stop reason: HOSPADM

## 2020-12-29 RX ADMIN — ZINC SULFATE 220 MG (50 MG) CAPSULE 50 MG: CAPSULE at 08:39

## 2020-12-29 RX ADMIN — TRAMADOL HYDROCHLORIDE 50 MG: 50 TABLET, FILM COATED ORAL at 02:20

## 2020-12-29 RX ADMIN — BUTALBITAL, ACETAMINOPHEN, AND CAFFEINE 1 TABLET: 50; 325; 40 TABLET ORAL at 06:47

## 2020-12-29 RX ADMIN — OXYCODONE HYDROCHLORIDE AND ACETAMINOPHEN 500 MG: 500 TABLET ORAL at 13:35

## 2020-12-29 RX ADMIN — Medication 2000 UNITS: at 08:39

## 2020-12-29 RX ADMIN — DEXAMETHASONE 8 MG: 4 TABLET ORAL at 01:45

## 2020-12-29 RX ADMIN — ACETAMINOPHEN 650 MG: 325 TABLET ORAL at 13:35

## 2020-12-29 RX ADMIN — PROMETHAZINE HYDROCHLORIDE 12.5 MG: 25 TABLET ORAL at 01:44

## 2020-12-29 RX ADMIN — LAMOTRIGINE 50 MG: 25 TABLET ORAL at 08:38

## 2020-12-29 RX ADMIN — Medication 100 MG: at 08:39

## 2020-12-29 RX ADMIN — DULOXETINE HYDROCHLORIDE 30 MG: 30 CAPSULE, DELAYED RELEASE ORAL at 08:38

## 2020-12-29 RX ADMIN — FLUTICASONE PROPIONATE 2 SPRAY: 50 SPRAY, METERED NASAL at 08:40

## 2020-12-29 RX ADMIN — INSULIN LISPRO 2 UNITS: 100 INJECTION, SOLUTION INTRAVENOUS; SUBCUTANEOUS at 12:41

## 2020-12-29 RX ADMIN — INSULIN LISPRO 1 UNITS: 100 INJECTION, SOLUTION INTRAVENOUS; SUBCUTANEOUS at 08:41

## 2020-12-29 RX ADMIN — SPIRONOLACTONE 25 MG: 25 TABLET ORAL at 08:39

## 2020-12-29 RX ADMIN — ENOXAPARIN SODIUM 40 MG: 40 INJECTION SUBCUTANEOUS at 08:38

## 2020-12-29 RX ADMIN — FAMOTIDINE 20 MG: 20 TABLET ORAL at 08:38

## 2020-12-29 RX ADMIN — LEVOTHYROXINE SODIUM 112 MCG: 0.11 TABLET ORAL at 06:44

## 2020-12-29 RX ADMIN — DILTIAZEM HYDROCHLORIDE 120 MG: 120 CAPSULE, COATED, EXTENDED RELEASE ORAL at 06:44

## 2020-12-29 ASSESSMENT — PAIN SCALES - GENERAL
PAINLEVEL_OUTOF10: 8
PAINLEVEL_OUTOF10: 7
PAINLEVEL_OUTOF10: 2
PAINLEVEL_OUTOF10: 2
PAINLEVEL_OUTOF10: 5

## 2020-12-29 NOTE — PROGRESS NOTES
DC instructions given, pt verbalized understanding. 2 RX given to pt. Lock box emptied. Pt belongings gathered. Tele box removed returned. Awaiting ride from family.

## 2020-12-29 NOTE — PROGRESS NOTES
auscultation, bilaterally without Rales/Wheezes/Rhonchi. Cardiovascular: Regular rate and rhythm with normal S1/S2 without murmurs, rubs or gallops. Abdomen: Soft, non-tender, non-distended with normal bowel sounds. Musculoskeletal: No clubbing, cyanosis or edema bilaterally. Full range of motion without deformity. Skin: Skin color, texture, turgor normal.  No rashes or lesions. Neurologic:  Neurovascularly intact without any focal sensory/motor deficits.  Cranial nerves: II-XII intact, grossly non-focal.  Psychiatric: Alert and oriented, thought content appropriate, normal insight  Capillary Refill: Brisk,< 3 seconds   Peripheral Pulses: +2 palpable, equal bilaterally       Labs:   Recent Labs     12/27/20 0432 12/28/20 0514 12/29/20 0527   WBC 2.3* 5.2 5.8   HGB 12.9 12.8 12.7   HCT 40.7 40.3 40.3    199 208     Recent Labs     12/27/20 0432 12/28/20 0514 12/29/20 0527   * 134* 137   K 4.5 4.5 4.7    99 101   CO2 27 28 28   BUN 12 14 15   CREATININE 0.9 0.8 0.8   CALCIUM 9.3 9.4 9.3     Recent Labs     12/27/20 0432 12/28/20 0514 12/29/20 0527   AST 42* 33 117*   ALT 54* 54* 185*   BILITOT 0.3 0.3 0.3   ALKPHOS 67 59 54     Recent Labs     12/27/20 0432 12/28/20 0514 12/29/20 0527   INR 0.97 1.05 1.05     Recent Labs     12/26/20  1922   TROPONINI <0.01       Urinalysis:      Lab Results   Component Value Date    NITRU Negative 12/26/2020    WBCUA 3-5 12/26/2020    BACTERIA 2+ 12/26/2020    RBCUA 0-2 12/26/2020    BLOODU TRACE-LYSED 12/26/2020    SPECGRAV 1.025 12/26/2020    GLUCOSEU Negative 12/26/2020       Consults:    IP CONSULT TO HOSPITALIST  IP CONSULT TO PHARMACY      Assessment/Plan:    Active Hospital Problems    Diagnosis    Hypothyroidism [E03.9]     Priority: High    Pneumonia due to COVID-19 virus [U07.1, J12.89]    Super-super obese ([facility] [address] [E66.01]    COVID-19 [U07.1]    Essential hypertension [I10]       COVID 19 Positive - NAAT positive 26 Dec. Sxs onset

## 2020-12-29 NOTE — PLAN OF CARE
Problem: Falls - Risk of:  Goal: Will remain free from falls  Description: Will remain free from falls  Outcome: Ongoing     Problem: Airway Clearance - Ineffective  Goal: Achieve or maintain patent airway  Outcome: Ongoing     Problem: Gas Exchange - Impaired  Goal: Absence of hypoxia  Outcome: Ongoing     Problem: Nutrition Deficits  Goal: Optimize nutrtional status  Outcome: Ongoing

## 2020-12-30 ENCOUNTER — CARE COORDINATION (OUTPATIENT)
Dept: CASE MANAGEMENT | Age: 45
End: 2020-12-30

## 2020-12-30 NOTE — CARE COORDINATION
Tre 45 Transitions Initial Follow Up Call    Call within 2 business days of discharge: Yes    Patient: Louis Bliss Patient : 1975   MRN: [id number]  Reason for Admission: Covid  Discharge Date: 20 RARS: Readmission Risk Score: 14      Last Discharge [address] [id number]       Complaint Diagnosis Description Type Department Provider    20 Shortness of Breath Pneumonia due to COVID-19 virus . .. ED to Hosp-Admission (Discharged) (ADMITTED) Vanessa Little MD; [facility]. .. Attempted to reach patient via phone for initial post hospital transition call. VM left stating purpose of call along with my contact information requesting a return call. Karen Da Silva RN   Care Transition Nurse  [phone]          Care Transitions 24 Hour Call    Care Transitions Interventions         Follow Up  No future appointments.     Karen Da Silva RN

## 2020-12-30 NOTE — DISCHARGE SUMMARY
Hospital Medicine Discharge Summary    Patient ID: Evelina Cadena      Patient's PCP: No primary care provider on file. Admit Date: 12/26/2020     Discharge Date: 12/29/2020      Admitting Physician: Vickie Mauriico MD     Discharge Physician: Casimer Fabry, MD     Discharge Diagnoses: Active Hospital Problems    Diagnosis    Hypothyroidism [E03.9]     Priority: High    Pneumonia due to COVID-19 virus [U07.1, J12.89]    Super-super obese (HCC) [E66.01]    COVID-19 [U07.1]    Essential hypertension [I10]       The patient was seen and examined on day of discharge and this discharge summary is in conjunction with any daily progress note from day of discharge. Hospital Course:          COVID 19 Positive - NAAT positive 26 Dec. Sxs onset approx 19 Dec w/ planned isolation through 8 Jan.  Currently on IV Steroids - continued. Remdesivir started 26 Dec.  S/P Plasma.      PNA - likely viral 2nd to above w/ multifocal ASD on admission CXR. Tx as above.     Acute Respiratory Failure - w/ hypoxia, POArrival 2nd to above. Supplemental O2 and wean as tolerated.      Sepsis - ruled out w/out Leukocytosis/Tachycardia/Fever or Elevated Lactate POArrival.     Transaminitis - etiology clinically unable to determine. Followed serial labs.       HTN - w/out known CAD and no evidence of active signs/sxs of ischemia/failure. Currently controlled on home meds.     HypoThyroid - clinically euthyroid on oral replacement therapy. Continue, w/ outpt monitoring as previously arranged.      DM2 - controlled on home oral antiGlycemics - held. Follow FSBS/SSI low regimen. Last HbA1c 5.2% dated June 2012. Resumed home regimen at discharge.      Morbid Obesity -  With Body mass index is 77.89 kg/m². Complicating assessment and treatment. Placing patient at risk for multiple co-morbidities as well as early death and contributing to the patient's presentation. Counseled on weight loss.        Labs:  For convenience

## 2020-12-31 ENCOUNTER — CARE COORDINATION (OUTPATIENT)
Dept: CASE MANAGEMENT | Age: 45
End: 2020-12-31

## 2020-12-31 NOTE — CARE COORDINATION
Tre 45 Transitions Initial Follow Up Call    Call within 2 business days of discharge: Yes    Patient: Heather aCstaneda Patient : 1975   MRN: [id number]  Reason for Admission: covid  Discharge Date: 20 RARS: Readmission Risk Score: 14      Last Discharge [facility]       Complaint Diagnosis Description Type Department Provider    20 Shortness of Breath Pneumonia due to COVID-19 virus . .. ED to Hosp-Admission (Discharged) (ADMITTED) Sarath Lu MD; [facility]. .. Second attempt made to reach patient for initial post hospital transition call. VM left stating purpose of call along with my contact information requesting a return call. Shannon Orozco RN   Care Transition Nurse  [phone]      Care Transitions 24 Hour Call    Care Transitions Interventions         Follow Up  No future appointments.     Shannon Orozco RN

## 2021-01-06 ENCOUNTER — CARE COORDINATION (OUTPATIENT)
Dept: CASE MANAGEMENT | Age: 46
End: 2021-01-06

## 2021-01-06 NOTE — CARE COORDINATION
Tre 45 Transitions Follow Up Call    2021    Patient: Mac Wolfe  Patient : 1975   MRN: [id number]  Reason for Admission: covid  Discharge Date: 20 RARS: Readmission Risk Score: 14         Final attempt to reach patient for initial post hospitalization transition call. CTN left message with contact information and request for call back. Episode closed. Care Transitions Subsequent and Final Call    Subsequent and Final Calls  Care Transitions Interventions  Other Interventions:            Follow Up  Future Appointments   Date Time Provider Yuki Martinez   2021  9:00 AM KYRIE Lucero RN

## 2021-01-07 ENCOUNTER — OFFICE VISIT (OUTPATIENT)
Dept: FAMILY MEDICINE CLINIC | Age: 46
End: 2021-01-07
Payer: COMMERCIAL

## 2021-01-07 VITALS — BODY MASS INDEX: 50.02 KG/M2 | TEMPERATURE: 97.2 F | WEIGHT: 293 LBS | HEIGHT: 64 IN

## 2021-01-07 DIAGNOSIS — H66.002 NON-RECURRENT ACUTE SUPPURATIVE OTITIS MEDIA OF LEFT EAR WITHOUT SPONTANEOUS RUPTURE OF TYMPANIC MEMBRANE: Primary | ICD-10-CM

## 2021-01-07 PROCEDURE — 99203 OFFICE O/P NEW LOW 30 MIN: CPT | Performed by: PHYSICIAN ASSISTANT

## 2021-01-07 RX ORDER — LEVOTHYROXINE SODIUM 112 UG/1
TABLET ORAL
COMMUNITY
Start: 2020-11-23

## 2021-01-07 RX ORDER — LAMOTRIGINE 25 MG/1
TABLET ORAL
COMMUNITY
Start: 2020-12-03

## 2021-01-07 RX ORDER — AZITHROMYCIN 250 MG/1
250 TABLET, FILM COATED ORAL SEE ADMIN INSTRUCTIONS
Qty: 6 TABLET | Refills: 0 | Status: SHIPPED | OUTPATIENT
Start: 2021-01-07 | End: 2021-01-12

## 2021-01-07 RX ORDER — FLUTICASONE PROPIONATE 50 MCG
1 SPRAY, SUSPENSION (ML) NASAL DAILY
COMMUNITY

## 2021-01-07 RX ORDER — DULOXETIN HYDROCHLORIDE 60 MG/1
CAPSULE, DELAYED RELEASE ORAL
COMMUNITY
Start: 2020-12-30

## 2021-01-07 ASSESSMENT — ENCOUNTER SYMPTOMS
SHORTNESS OF BREATH: 0
SINUS PRESSURE: 0
WHEEZING: 0
COUGH: 1
TROUBLE SWALLOWING: 0
SINUS PAIN: 0
SORE THROAT: 0
VOICE CHANGE: 0
RHINORRHEA: 1

## 2021-01-07 NOTE — PROGRESS NOTES
2021    TELEHEALTH EVALUATION -- Audio/Visual (During [place]UOV-71 public health emergency)    HPI:    Mac Wolfe (:  1975) has requested an audio/video evaluation for the following concern(s):    Diagnosed with COVID-19 Hospitalized. Lingering cough, taking tessalon perles which has been helpful  Current complaint: left ear otalgia and pressure, left sided lymphadenopathy   No drainage from the ear  No change to hearing  Pain radiates into the left side of her neck  -fever, pharyngitis, headache  She has recently been on steroids and antiviral medication but no anbiotic    Review of Systems   Constitutional: Negative for appetite change, chills, fatigue and fever. HENT: Positive for congestion, ear pain and rhinorrhea. Negative for ear discharge, postnasal drip, sinus pressure, sinus pain, sneezing, sore throat, trouble swallowing and voice change. Respiratory: Positive for cough. Negative for shortness of breath and wheezing. Hematological: Positive for adenopathy. Prior to Visit Medications    Medication Sig Taking?  Authorizing Provider   levothyroxine (SYNTHROID) 112 MCG tablet TK 1 T PO QD Yes Historical Provider, MD   lamoTRIgine (LAMICTAL) 25 MG tablet TK 1 T PO QD FOR 2 WKS THEN TK 2 TS PO QD FOR 2 WKS Yes Historical Provider, MD   DULoxetine (CYMBALTA) 60 MG extended release capsule TAKE 1 CAPSULE BY MOUTH EVERY DAY Yes Historical Provider, MD   Cetirizine HCl (ZYRTEC PO) Take by mouth Yes Historical Provider, MD   fluticasone (FLONASE) 50 MCG/ACT nasal spray 1 spray by Each Nostril route daily Yes Historical Provider, MD   azithromycin (ZITHROMAX) 250 MG tablet Take 1 tablet by mouth See Admin Instructions for 5 days 500mg on day 1 followed by 250mg on days 2 - 5 Yes KYRIE Sosa   dilTIAZem (CARDIZEM CD) 180 MG extended release capsule Take 180 mg by mouth daily Yes Historical Provider, MD spironolactone (ALDACTONE) 50 MG tablet Take 50 mg by mouth daily Yes Historical Provider, MD       Social History     Tobacco Use    Smoking status: Never Smoker    Smokeless tobacco: Never Used   Substance Use Topics    Alcohol use: No    Drug use: No        Allergies   Allergen Reactions    Bactrim Hives    Biaxin [Clarithromycin] Nausea Only    Erythromycin      Stomach upset   ,   Past Medical History:   Diagnosis Date    Allergic rhinitis     Depression     Hypertension     Hypothyroidism    ,   Past Surgical History:   Procedure Laterality Date    CHOLECYSTECTOMY  2006    LAP BAND      LAP BAND  2010    TONSILLECTOMY  11/23/11   ,   Social History     Tobacco Use    Smoking status: Never Smoker    Smokeless tobacco: Never Used   Substance Use Topics    Alcohol use: No    Drug use: No       PHYSICAL EXAMINATION:  [ INSTRUCTIONS:  \"[x]\" Indicates a positive item  \"[]\" Indicates a negative item  -- DELETE ALL ITEMS NOT EXAMINED]  Vital Signs: (As obtained by patient/caregiver or practitioner observation)    Blood pressure-  Heart rate-    Respiratory rate- 14   Temperature-  Pulse oximetry-     Constitutional: [x] Appears well-developed and well-nourished [x] No apparent distress      [] Abnormal-   Mental status  [x] Alert and awake  [x] Oriented to person/place/time [x]Able to follow commands      Eyes:  EOM    [x]  Normal  [] Abnormal-  Sclera  [x]  Normal  [] Abnormal -         Discharge [x]  None visible  [] Abnormal -    HENT:   [x] Normocephalic, atraumatic.   [] Abnormal   [x] Mouth/Throat: Mucous membranes are moist.     External Ears [x] Normal  [] Abnormal-     Neck: [x] No visualized mass     Pulmonary/Chest: [x] Respiratory effort normal.  [x] No visualized signs of difficulty breathing or respiratory distress        [x] Abnormal-  Dry cough     Musculoskeletal:   [x] Normal gait with no signs of ataxia         [x] Normal range of motion of neck        [] Abnormal- Neurological:        [x] No Facial Asymmetry (Cranial nerve 7 motor function) (limited exam to video visit)          [] No gaze palsy        [] Abnormal-         Skin:        [x] No significant exanthematous lesions or discoloration noted on facial skin         [] Abnormal-            Psychiatric:       [] Normal Affect [] No Hallucinations        [] Abnormal-     Other pertinent observable physical exam findings-     ASSESSMENT/PLAN:  1. Non-recurrent acute suppurative otitis media of left ear without spontaneous rupture of tympanic membrane  -  Pt is from [place] and has no intentions of establishing care in our network. She will return home in 2 weeks  - azithromycin (ZITHROMAX) 250 MG tablet; Take 1 tablet by mouth See Admin Instructions for 5 days 500mg on day 1 followed by 250mg on days 2 - 5  Dispense: 6 tablet; Refill: 0      No follow-ups on file. Roro Kolb is a 39 y.o. female being evaluated by a Virtual Visit (video visit) encounter to address concerns as mentioned above. A caregiver was present when appropriate. Due to this being a TeleHealth encounter (During AXXK-36 public health emergency), evaluation of the following organ systems was limited: Vitals/Constitutional/EENT/Resp/CV/GI//MS/Neuro/Skin/Heme-Lymph-Imm. Pursuant to the emergency declaration under the [address] [place] [address], [address] [place] [address] authority and the [employer] Act, this Virtual Visit was conducted with patient's (and/or legal guardian's) consent, to reduce the patient's risk of exposure to COVID-19 and provide necessary medical care. The patient (and/or legal guardian) has also been advised to contact this office for worsening conditions or problems, and seek emergency medical treatment and/or call 911 if deemed necessary.      Patient identification was verified at the start of the visit: Yes Total time spent on this encounter: Not billed by time    Services were provided through a video synchronous discussion virtually to substitute for in-person clinic visit. Patient and provider were located at their individual homes. --KYRIE Mena on 1/7/2021 at 12:31 PM    An electronic signature was used to authenticate this note.